# Patient Record
Sex: FEMALE | Race: WHITE | Employment: FULL TIME | URBAN - METROPOLITAN AREA
[De-identification: names, ages, dates, MRNs, and addresses within clinical notes are randomized per-mention and may not be internally consistent; named-entity substitution may affect disease eponyms.]

---

## 2017-04-03 LAB
CHOLESTEROL, TOTAL: 164 MG/DL (ref 0–199)
GLUCOSE BLD-MCNC: 94 MG/DL (ref 70–99)
HDLC SERPL-MCNC: 49 MG/DL (ref 40–60)
LDL CHOLESTEROL CALCULATED: 66 MG/DL
TRIGL SERPL-MCNC: 244 MG/DL (ref 0–150)

## 2018-05-19 ENCOUNTER — EMPLOYEE WELLNESS (OUTPATIENT)
Dept: OTHER | Age: 28
End: 2018-05-19

## 2018-05-19 LAB
CHOLESTEROL, TOTAL: 131 MG/DL (ref 0–199)
GLUCOSE BLD-MCNC: 79 MG/DL (ref 70–99)
HDLC SERPL-MCNC: 62 MG/DL (ref 40–60)
LDL CHOLESTEROL CALCULATED: 49 MG/DL
TRIGL SERPL-MCNC: 100 MG/DL (ref 0–150)

## 2018-05-29 VITALS — WEIGHT: 232 LBS

## 2019-02-19 ENCOUNTER — HOSPITAL ENCOUNTER (OUTPATIENT)
Age: 29
Discharge: HOME | End: 2019-02-19
Payer: COMMERCIAL

## 2019-02-19 LAB
ESTRADIOL LEVEL: 45 PG/ML
FOLLICLE STIMULATING HORMONE: 5.7 MIU/ML
GLUCOSE FASTING: 86 MG/DL (ref 70–99)
LUTEINIZING HORMONE: 8.7 MIU/ML
PROLACTIN: 9.3 NG/ML
TSH SERPL DL<=0.05 MIU/L-ACNC: 2.82 UIU/ML (ref 0.27–4.2)

## 2019-02-19 PROCEDURE — 36415 COLL VENOUS BLD VENIPUNCTURE: CPT

## 2019-02-19 PROCEDURE — 84702 CHORIONIC GONADOTROPIN TEST: CPT

## 2019-02-19 PROCEDURE — 82947 ASSAY GLUCOSE BLOOD QUANT: CPT

## 2019-02-19 PROCEDURE — 82670 ASSAY OF TOTAL ESTRADIOL: CPT

## 2019-02-19 PROCEDURE — 83001 ASSAY OF GONADOTROPIN (FSH): CPT

## 2019-02-19 PROCEDURE — 84403 ASSAY OF TOTAL TESTOSTERONE: CPT

## 2019-02-19 PROCEDURE — 84146 ASSAY OF PROLACTIN: CPT

## 2019-02-19 PROCEDURE — 84443 ASSAY THYROID STIM HORMONE: CPT

## 2019-02-19 PROCEDURE — 84270 ASSAY OF SEX HORMONE GLOBUL: CPT

## 2019-02-19 PROCEDURE — 83002 ASSAY OF GONADOTROPIN (LH): CPT

## 2019-02-20 LAB — GONADOTROPIN, CHORIONIC (HCG) QUANT: <5 MIU/ML

## 2019-02-21 LAB
SEX HORMONE BINDING GLOBULIN: 30 NMOL/L (ref 30–135)
TESTOSTERONE FREE-NONMALE: 12.8 PG/ML (ref 0.8–7.4)
TESTOSTERONE TOTAL: 67 NG/DL (ref 20–70)

## 2019-10-22 LAB
ABO, EXTERNAL RESULT: NORMAL
C. TRACHOMATIS, EXTERNAL RESULT: NEGATIVE
HEP B, EXTERNAL RESULT: NEGATIVE
HIV, EXTERNAL RESULT: NEGATIVE
N. GONORRHOEAE, EXTERNAL RESULT: NEGATIVE
RH FACTOR, EXTERNAL RESULT: POSITIVE
RPR, EXTERNAL RESULT: NON REACTIVE
RUBELLA TITER, EXTERNAL RESULT: NORMAL

## 2020-02-25 ENCOUNTER — HOSPITAL ENCOUNTER (OUTPATIENT)
Dept: DIABETES SERVICES | Age: 30
Setting detail: THERAPIES SERIES
Discharge: HOME | End: 2020-02-25
Payer: COMMERCIAL

## 2020-02-25 VITALS — WEIGHT: 241 LBS

## 2020-02-25 PROCEDURE — G0109 DIAB MANAGE TRN IND/GROUP: HCPCS | Performed by: DIETITIAN, REGISTERED

## 2020-02-25 ASSESSMENT — PATIENT HEALTH QUESTIONNAIRE - PHQ9
2. FEELING DOWN, DEPRESSED OR HOPELESS: 0
SUM OF ALL RESPONSES TO PHQ QUESTIONS 1-9: 1
1. LITTLE INTEREST OR PLEASURE IN DOING THINGS: 1
SUM OF ALL RESPONSES TO PHQ9 QUESTIONS 1 & 2: 1
SUM OF ALL RESPONSES TO PHQ QUESTIONS 1-9: 1

## 2020-02-25 NOTE — LETTER
Diabetes Education  Ciaran Adkins 76: [address] [facility]    RE: Sabas BALTAZAR.: 1990    Your patient was seen for Gestational Diabetes Education on 2020. EDUCATION INCLUDED:   [x]  Definition of Gestational Diabetes                                                                                 [x]  Risk Factors/Acute Complications     [x]  Blood Glucose Monitoring        [x]  Exercise/Activity    [x]  Carbohydrate Counting  [x]  Timing of Meals    [x]  Food Logs  [x]  Eating Out  [x]  Nutrition Precautions for Pregnancy  [x]  Use of Blood glucose meter, including return demonstration:       Random Blood glucose at visit: 84 mg/dl ( about 4 hours after breakfast)  [x]  PHQ2 Depression Screen; patient scored 1. Recommend further evaluation if       score >3    PATIENT GOALS:  [x]  Follow meal plan with appropriate Carbohydrate intake:   Breakfast: 15 - 30 g Carb    Lunch: 30 - 45 g Carb   Dinner:  45 - 60 g Carb    3 snacks: 15 - 30 g Carb each  [x] Monitor blood glucose four times daily. FBG Goal: 60 - 95 or as recommended by physician   1hr PP <140 or as recommended by physician   2 hr PP <120 or as recommended by physician  [x] Exercise    [x] Log glucose results and bring to physicians office at each scheduled appointment  [x] Other:     PLAN:  [x] Return for follow-up visit to review Food/Log      Thank you for the opportunity to provide Diabetes Self Management Education to your patient.     Meghan Templeton  Diabetes Educator

## 2020-02-25 NOTE — PROGRESS NOTES
Gestational Diabetes Patient Assessment and Education    Name: Lj Santiago : 1990  EDC:20        Education Completed  [x]  Definition of Gestational Diabetes                                                                                 [x]  Risk Factors/Acute Complications     [x]  Blood Glucose Monitoring--schedule, target levels     [x]  Exercise/Activity    [x]  Carbohydrate Counting  [x]  Timing of Meals    [x]  Food Logs  [x]  Eating Out  [x]  Nutrition Precautions for Pregnancy  []  Use of Blood glucose meter            Patient Goals  [x]  Follow meal plan with appropriate Carbohydrate intake:   Breakfast: 30 g Carb    Lunch: 30 - 45 g Carb   Dinner:  45 - 60 g Carb    3 snacks: 15 - 30 g Carb each  [x] Monitor blood glucose four times daily.     FBG Goal: 60 - 95 or as recommended by physician   1hr PP <140 or as recommended by physician   2 hr PP <120 or as recommended by physician  [x] Exercise    [x] Log glucose results and bring to physicians office at each scheduled appointment  [] Other:     Plan  [x] Return for follow-up visit to review Food/Log      Referring Provider: Ca Mathews    Total participants in Group:2

## 2020-02-28 LAB
ORGANISM: ABNORMAL
URINE CULTURE, ROUTINE: ABNORMAL

## 2020-03-04 ENCOUNTER — HOSPITAL ENCOUNTER (OUTPATIENT)
Dept: DIABETES SERVICES | Age: 30
Setting detail: THERAPIES SERIES
Discharge: HOME | End: 2020-03-04
Payer: COMMERCIAL

## 2020-03-04 PROCEDURE — G0108 DIAB MANAGE TRN  PER INDIV: HCPCS | Performed by: DIETITIAN, REGISTERED

## 2020-03-04 NOTE — LETTER
Diabetes Education  Ciaran Adkins 76: [address] [facility]    RE: Opal BALTAZAR.: 1990    Your patient attended a Follow-up session for Gestational Diabetes on 3/4/2020. Education included the following:   [x] Review of BG Log   Fasting BG Range: mg/dl     2 hr PP BG Range:  mg/dl                                                                   [x] Review of Food Log   [] Additional Resources  [x]  Diet guidelines for sick days  [x] Post-Partum Guidelines  [x] Other: Eats dinner late most evenings, recommended walking after dinner. Post Program Recommendations include:  [x] Diabetes Screening 6 - 12 weeks post partum  [] Pre-Diabetes Group Class   [] Other : Thank you for the opportunity to provide Diabetes Education to your patient.     [address] Diabetes Educator

## 2020-03-04 NOTE — PROGRESS NOTES
Gestational Diabetes Patient Assessment and Education    Name: Kaleigh Tong : 1990  EDC:20    BG/FOOD LOG REVIEW:  [x]  BG Log reviewed: FBG range:  mg/dl   PP BG range:  mg/dl  [x]  Other:Recommended exercise after dinner.      Education Completed  [x]  Problem Solving   [x]  Carbohydrate Counting  [x]  Timing of Meals    [x]  Post-Partum Guidelines  [x]  Diet guidelines for sick days  []  Additional Resources  []  Other:    Participant selected Post-Program Diabetes Self-Management Support Plan:   [] Nurse appointment  [] Dietitian appointment  [x] Follow-up with Referring Provider  [] Other:       Referring Provider: Paty Whatley  Total time spent with patient: 30 minutes

## 2020-03-10 ENCOUNTER — OFFICE VISIT (OUTPATIENT)
Dept: FAMILY MEDICINE CLINIC | Age: 30
End: 2020-03-10
Payer: COMMERCIAL

## 2020-03-10 VITALS
OXYGEN SATURATION: 98 % | HEART RATE: 115 BPM | DIASTOLIC BLOOD PRESSURE: 84 MMHG | SYSTOLIC BLOOD PRESSURE: 120 MMHG | BODY MASS INDEX: 38.89 KG/M2 | TEMPERATURE: 98.3 F | WEIGHT: 242 LBS | HEIGHT: 66 IN

## 2020-03-10 LAB
INFLUENZA A ANTIGEN, POC: NORMAL
INFLUENZA B ANTIGEN, POC: NORMAL

## 2020-03-10 PROCEDURE — 90715 TDAP VACCINE 7 YRS/> IM: CPT | Performed by: FAMILY MEDICINE

## 2020-03-10 PROCEDURE — 87804 INFLUENZA ASSAY W/OPTIC: CPT | Performed by: FAMILY MEDICINE

## 2020-03-10 PROCEDURE — 90471 IMMUNIZATION ADMIN: CPT | Performed by: FAMILY MEDICINE

## 2020-03-10 PROCEDURE — 99214 OFFICE O/P EST MOD 30 MIN: CPT | Performed by: FAMILY MEDICINE

## 2020-03-10 RX ORDER — AZITHROMYCIN 250 MG/1
TABLET, FILM COATED ORAL
Qty: 6 TABLET | Refills: 0 | Status: SHIPPED | OUTPATIENT
Start: 2020-03-10 | End: 2020-10-30 | Stop reason: ALTCHOICE

## 2020-03-10 ASSESSMENT — ENCOUNTER SYMPTOMS
DIARRHEA: 0
COUGH: 1
VOICE CHANGE: 0
SHORTNESS OF BREATH: 0
WHEEZING: 0
EYE REDNESS: 0
NAUSEA: 0
CONSTIPATION: 0
RHINORRHEA: 1
VOMITING: 0

## 2020-03-10 NOTE — PROGRESS NOTES
Rhythm: Regular rhythm. Tachycardia present. Pulmonary:      Effort: Pulmonary effort is normal. No accessory muscle usage or respiratory distress. Breath sounds: Normal breath sounds. No wheezing, rhonchi or rales. Lymphadenopathy:      Cervical: No cervical adenopathy. Skin:     General: Skin is warm and dry. Findings: No erythema or rash. Neurological:      Mental Status: She is alert and oriented to person, place, and time. Deep Tendon Reflexes: Reflexes are normal and symmetric. Psychiatric:         Mood and Affect: Mood normal.         Behavior: Behavior normal.         Thought Content: Thought content normal.         Judgment: Judgment normal.          Diagnosis Orders   1. Acute bronchitis, unspecified organism     2. Flu-like symptoms  POCT Influenza A/B Antigen   3. Incidental pregnancy     4. Sinus tachycardia       Adalid Fleming was seen today for established new doctor, cough and chest congestion. Diagnoses and all orders for this visit:    Acute bronchitis, unspecified organism  -     azithromycin (ZITHROMAX) 250 MG tablet; 500mg on day 1 followed by 250mg on days 2 - 5  Flu-like symptoms  -     POCT Influenza A/B Antigen    Incidental pregnancy  -     Tdap (age 6y and older) IM (BOOSTRIX)  Sinus tachycardia: probably due to bmi 39, pregnant and current bronchtis, no distress                    An electronic signature was used to authenticate this note. This was dictated. Errors mightbe possible due to dictation.   --Melly Toth, DO

## 2020-03-13 ENCOUNTER — TELEPHONE (OUTPATIENT)
Dept: FAMILY MEDICINE CLINIC | Age: 30
End: 2020-03-13

## 2020-03-13 RX ORDER — AZITHROMYCIN 250 MG/1
250 TABLET, FILM COATED ORAL SEE ADMIN INSTRUCTIONS
Qty: 6 TABLET | Refills: 0 | Status: SHIPPED | OUTPATIENT
Start: 2020-03-13 | End: 2020-03-18

## 2020-03-13 NOTE — TELEPHONE ENCOUNTER
Mehreen Smart is 5 days and it stays in your system for 5 more days, so I do not recommend she take another antibiotic at this point. Also, antibiotics do not treat any viral component of her illness, her body will get over that part on its own. OK to use over the counter flonase or saline nasal spray for congestion. She can rest and drink lots of fluids to help thin out her mucous. If worsens, she can let us know.

## 2020-03-13 NOTE — TELEPHONE ENCOUNTER
Pt called stating that she is almost on the last dose of Zithromax and was wondering if she could get a 2nd dose of the medication. She is starting to feel better but still has a lot of congestion. Medication-  azthromycin (Zithromax) 250 mg tablet    Pharmacy:  [address], [address] [place] [address] [phone] Norbert Jackie [phone]    Last appt  3/10/2020    No future appointments.

## 2020-04-14 LAB — GBS, EXTERNAL RESULT: NEGATIVE

## 2020-05-05 ENCOUNTER — ANESTHESIA (OUTPATIENT)
Dept: LABOR AND DELIVERY | Age: 30
End: 2020-05-05
Payer: COMMERCIAL

## 2020-05-05 ENCOUNTER — ANESTHESIA EVENT (OUTPATIENT)
Dept: LABOR AND DELIVERY | Age: 30
End: 2020-05-05
Payer: COMMERCIAL

## 2020-05-05 ENCOUNTER — HOSPITAL ENCOUNTER (INPATIENT)
Age: 30
LOS: 3 days | Discharge: HOME | End: 2020-05-08
Attending: OBSTETRICS & GYNECOLOGY | Admitting: OBSTETRICS & GYNECOLOGY
Payer: COMMERCIAL

## 2020-05-05 ENCOUNTER — APPOINTMENT (OUTPATIENT)
Dept: LABOR AND DELIVERY | Age: 30
End: 2020-05-05
Payer: COMMERCIAL

## 2020-05-05 PROBLEM — Z3A.39 39 WEEKS GESTATION OF PREGNANCY: Status: ACTIVE | Noted: 2020-05-05

## 2020-05-05 LAB
ABO/RH: NORMAL
AMPHETAMINE SCREEN, URINE: NORMAL
ANTIBODY SCREEN: NORMAL
BARBITURATE SCREEN URINE: NORMAL
BASOPHILS ABSOLUTE: 0.1 K/UL (ref 0–0.2)
BASOPHILS RELATIVE PERCENT: 0.9 %
BENZODIAZEPINE SCREEN, URINE: NORMAL
BUPRENORPHINE URINE: NORMAL
CANNABINOID SCREEN URINE: NORMAL
COCAINE METABOLITE SCREEN URINE: NORMAL
EOSINOPHILS ABSOLUTE: 0.2 K/UL (ref 0–0.6)
EOSINOPHILS RELATIVE PERCENT: 1.4 %
HCT VFR BLD CALC: 35.1 % (ref 36–48)
HEMOGLOBIN: 11.7 G/DL (ref 12–16)
LYMPHOCYTES ABSOLUTE: 2.7 K/UL (ref 1–5.1)
LYMPHOCYTES RELATIVE PERCENT: 22.3 %
Lab: NORMAL
MCH RBC QN AUTO: 26.6 PG (ref 26–34)
MCHC RBC AUTO-ENTMCNC: 33.4 G/DL (ref 31–36)
MCV RBC AUTO: 79.7 FL (ref 80–100)
METHADONE SCREEN, URINE: NORMAL
MONOCYTES ABSOLUTE: 0.7 K/UL (ref 0–1.3)
MONOCYTES RELATIVE PERCENT: 5.5 %
NEUTROPHILS ABSOLUTE: 8.5 K/UL (ref 1.7–7.7)
NEUTROPHILS RELATIVE PERCENT: 69.9 %
OPIATE SCREEN URINE: NORMAL
OXYCODONE URINE: NORMAL
PDW BLD-RTO: 14.9 % (ref 12.4–15.4)
PH UA: 6
PHENCYCLIDINE SCREEN URINE: NORMAL
PLATELET # BLD: 345 K/UL (ref 135–450)
PMV BLD AUTO: 8.5 FL (ref 5–10.5)
PROPOXYPHENE SCREEN: NORMAL
RBC # BLD: 4.4 M/UL (ref 4–5.2)
TOTAL SYPHILLIS IGG/IGM: NORMAL
WBC # BLD: 12.1 K/UL (ref 4–11)

## 2020-05-05 PROCEDURE — 2580000003 HC RX 258: Performed by: OBSTETRICS & GYNECOLOGY

## 2020-05-05 PROCEDURE — 86901 BLOOD TYPING SEROLOGIC RH(D): CPT

## 2020-05-05 PROCEDURE — 3700000025 EPIDURAL BLOCK: Performed by: ANESTHESIOLOGY

## 2020-05-05 PROCEDURE — 2500000003 HC RX 250 WO HCPCS: Performed by: NURSE ANESTHETIST, CERTIFIED REGISTERED

## 2020-05-05 PROCEDURE — 86900 BLOOD TYPING SEROLOGIC ABO: CPT

## 2020-05-05 PROCEDURE — 51701 INSERT BLADDER CATHETER: CPT

## 2020-05-05 PROCEDURE — 80307 DRUG TEST PRSMV CHEM ANLYZR: CPT

## 2020-05-05 PROCEDURE — 86780 TREPONEMA PALLIDUM: CPT

## 2020-05-05 PROCEDURE — 86850 RBC ANTIBODY SCREEN: CPT

## 2020-05-05 PROCEDURE — 1220000000 HC SEMI PRIVATE OB R&B

## 2020-05-05 PROCEDURE — 6360000002 HC RX W HCPCS: Performed by: OBSTETRICS & GYNECOLOGY

## 2020-05-05 PROCEDURE — 85025 COMPLETE CBC W/AUTO DIFF WBC: CPT

## 2020-05-05 RX ORDER — ACETAMINOPHEN 325 MG/1
650 TABLET ORAL EVERY 4 HOURS PRN
Status: DISCONTINUED | OUTPATIENT
Start: 2020-05-05 | End: 2020-05-06

## 2020-05-05 RX ORDER — SODIUM CHLORIDE, SODIUM LACTATE, POTASSIUM CHLORIDE, CALCIUM CHLORIDE 600; 310; 30; 20 MG/100ML; MG/100ML; MG/100ML; MG/100ML
INJECTION, SOLUTION INTRAVENOUS CONTINUOUS
Status: DISCONTINUED | OUTPATIENT
Start: 2020-05-05 | End: 2020-05-06

## 2020-05-05 RX ORDER — SODIUM CHLORIDE 0.9 % (FLUSH) 0.9 %
10 SYRINGE (ML) INJECTION PRN
Status: DISCONTINUED | OUTPATIENT
Start: 2020-05-05 | End: 2020-05-06

## 2020-05-05 RX ORDER — LIDOCAINE HYDROCHLORIDE 10 MG/ML
30 INJECTION, SOLUTION EPIDURAL; INFILTRATION; INTRACAUDAL; PERINEURAL PRN
Status: DISCONTINUED | OUTPATIENT
Start: 2020-05-05 | End: 2020-05-06

## 2020-05-05 RX ORDER — SODIUM CHLORIDE 0.9 % (FLUSH) 0.9 %
10 SYRINGE (ML) INJECTION EVERY 12 HOURS SCHEDULED
Status: DISCONTINUED | OUTPATIENT
Start: 2020-05-05 | End: 2020-05-06

## 2020-05-05 RX ORDER — DIPHENHYDRAMINE HYDROCHLORIDE 50 MG/ML
25 INJECTION INTRAMUSCULAR; INTRAVENOUS EVERY 4 HOURS PRN
Status: DISCONTINUED | OUTPATIENT
Start: 2020-05-05 | End: 2020-05-06

## 2020-05-05 RX ORDER — BUPIVACAINE HYDROCHLORIDE 2.5 MG/ML
INJECTION, SOLUTION EPIDURAL; INFILTRATION; INTRACAUDAL PRN
Status: DISCONTINUED | OUTPATIENT
Start: 2020-05-05 | End: 2020-05-06 | Stop reason: SDUPTHER

## 2020-05-05 RX ORDER — ONDANSETRON 2 MG/ML
4 INJECTION INTRAMUSCULAR; INTRAVENOUS EVERY 6 HOURS PRN
Status: DISCONTINUED | OUTPATIENT
Start: 2020-05-05 | End: 2020-05-06

## 2020-05-05 RX ADMIN — BUPIVACAINE HYDROCHLORIDE 5 ML: 2.5 INJECTION, SOLUTION EPIDURAL; INFILTRATION; INTRACAUDAL; PERINEURAL at 14:30

## 2020-05-05 RX ADMIN — SODIUM CHLORIDE, POTASSIUM CHLORIDE, SODIUM LACTATE AND CALCIUM CHLORIDE: 600; 310; 30; 20 INJECTION, SOLUTION INTRAVENOUS at 14:14

## 2020-05-05 RX ADMIN — ONDANSETRON 4 MG: 2 INJECTION INTRAMUSCULAR; INTRAVENOUS at 20:10

## 2020-05-05 RX ADMIN — SODIUM CHLORIDE, POTASSIUM CHLORIDE, SODIUM LACTATE AND CALCIUM CHLORIDE: 600; 310; 30; 20 INJECTION, SOLUTION INTRAVENOUS at 19:23

## 2020-05-05 RX ADMIN — Medication 15 ML/HR: at 14:34

## 2020-05-05 RX ADMIN — Medication 1 MILLI-UNITS/MIN: at 09:25

## 2020-05-05 RX ADMIN — SODIUM CHLORIDE, POTASSIUM CHLORIDE, SODIUM LACTATE AND CALCIUM CHLORIDE: 600; 310; 30; 20 INJECTION, SOLUTION INTRAVENOUS at 08:45

## 2020-05-05 ASSESSMENT — PAIN - FUNCTIONAL ASSESSMENT: PAIN_FUNCTIONAL_ASSESSMENT: 0-10

## 2020-05-05 NOTE — PROGRESS NOTES
28yo G1P) at 39.4wks presented to [facility] for induction of labor. Ambulated to and oriented to room 384. Monitors applied. Hx obtained. Consents signed.

## 2020-05-05 NOTE — H&P
service: Not on file     Active member of club or organization: Not on file     Attends meetings of clubs or organizations: Not on file     Relationship status: Not on file    Intimate partner violence     Fear of current or ex partner: Not on file     Emotionally abused: Not on file     Physically abused: Not on file     Forced sexual activity: Not on file   Other Topics Concern    Not on file   Social History Narrative    Not on file     Family History:       Problem Relation Age of Onset    High Blood Pressure Mother     High Cholesterol Mother      Medications Prior to Admission:  Medications Prior to Admission: Prenat-Fe Carbonyl-FA-Omega 3 (ONE-A-DAY WOMENS PRENATAL 1 PO),   azithromycin (ZITHROMAX) 250 MG tablet, 500mg on day 1 followed by 250mg on days 2 - 5    REVIEW OF SYSTEMS:    wnl    PHYSICAL EXAM:  Vitals:    05/05/20 0836 05/05/20 0852 05/05/20 0933   BP: (!) 160/95 (!) 159/101    Pulse: 112 109    Resp: 16     Temp: 98.6 °F (37 °C)     TempSrc: Oral     Weight:   250 lb (113.4 kg)   Height:   5' 5\" (1.651 m)     General appearance:  awake, alert, cooperative, no apparent distress, and appears stated age  Neurologic:  Awake, alert, oriented to name, place and time. Lungs:  No increased work of breathing, good air exchange  Abdomen:  Soft, non tender, gravid, consistent with her gestational age, EFW by Leopold's maneuver was 3800g   Fetal heart rate:  Reassuring.   Pelvis:  Adequate pelvis  Cervix: 3/80/-2  Contraction frequency:  none    Membranes:  Intact    Labs:   CBC:   Lab Results   Component Value Date    WBC 12.1 05/05/2020    RBC 4.40 05/05/2020    HGB 11.7 05/05/2020    HCT 35.1 05/05/2020    MCV 79.7 05/05/2020    MCH 26.6 05/05/2020    MCHC 33.4 05/05/2020    RDW 14.9 05/05/2020     05/05/2020    MPV 8.5 05/05/2020       ASSESSMENT AND PLAN:    Labor: Admit, anticipate normal delivery, routine labor orders  Fetus: Reassuring  GBS: No  Other: pitocin

## 2020-05-05 NOTE — PROGRESS NOTES
Dr Joana Do made aware of increase in [address] [facility] with temp of 99.2 Ax (97.8 oral) and increasing in B/P. No new orders noted, will continue to monitor.

## 2020-05-06 VITALS — OXYGEN SATURATION: 97 % | SYSTOLIC BLOOD PRESSURE: 94 MMHG | DIASTOLIC BLOOD PRESSURE: 49 MMHG

## 2020-05-06 PROCEDURE — 3700000000 HC ANESTHESIA ATTENDED CARE: Performed by: OBSTETRICS & GYNECOLOGY

## 2020-05-06 PROCEDURE — 2580000003 HC RX 258: Performed by: NURSE ANESTHETIST, CERTIFIED REGISTERED

## 2020-05-06 PROCEDURE — 7100000001 HC PACU RECOVERY - ADDTL 15 MIN: Performed by: OBSTETRICS & GYNECOLOGY

## 2020-05-06 PROCEDURE — 6360000002 HC RX W HCPCS: Performed by: OBSTETRICS & GYNECOLOGY

## 2020-05-06 PROCEDURE — 6370000000 HC RX 637 (ALT 250 FOR IP): Performed by: OBSTETRICS & GYNECOLOGY

## 2020-05-06 PROCEDURE — 3609079900 HC CESAREAN SECTION: Performed by: OBSTETRICS & GYNECOLOGY

## 2020-05-06 PROCEDURE — 2709999900 HC NON-CHARGEABLE SUPPLY: Performed by: OBSTETRICS & GYNECOLOGY

## 2020-05-06 PROCEDURE — 3700000001 HC ADD 15 MINUTES (ANESTHESIA): Performed by: OBSTETRICS & GYNECOLOGY

## 2020-05-06 PROCEDURE — 6360000002 HC RX W HCPCS: Performed by: NURSE ANESTHETIST, CERTIFIED REGISTERED

## 2020-05-06 PROCEDURE — 2580000003 HC RX 258: Performed by: OBSTETRICS & GYNECOLOGY

## 2020-05-06 PROCEDURE — 2500000003 HC RX 250 WO HCPCS: Performed by: NURSE ANESTHETIST, CERTIFIED REGISTERED

## 2020-05-06 PROCEDURE — 1220000000 HC SEMI PRIVATE OB R&B

## 2020-05-06 PROCEDURE — 7100000000 HC PACU RECOVERY - FIRST 15 MIN: Performed by: OBSTETRICS & GYNECOLOGY

## 2020-05-06 RX ORDER — OXYTOCIN 10 [USP'U]/ML
INJECTION, SOLUTION INTRAMUSCULAR; INTRAVENOUS PRN
Status: DISCONTINUED | OUTPATIENT
Start: 2020-05-06 | End: 2020-05-06 | Stop reason: SDUPTHER

## 2020-05-06 RX ORDER — DEXAMETHASONE SODIUM PHOSPHATE 4 MG/ML
INJECTION, SOLUTION INTRA-ARTICULAR; INTRALESIONAL; INTRAMUSCULAR; INTRAVENOUS; SOFT TISSUE PRN
Status: DISCONTINUED | OUTPATIENT
Start: 2020-05-06 | End: 2020-05-06 | Stop reason: SDUPTHER

## 2020-05-06 RX ORDER — ONDANSETRON 2 MG/ML
4 INJECTION INTRAMUSCULAR; INTRAVENOUS EVERY 6 HOURS PRN
Status: DISCONTINUED | OUTPATIENT
Start: 2020-05-06 | End: 2020-05-08 | Stop reason: HOSPADM

## 2020-05-06 RX ORDER — PROPOFOL 10 MG/ML
INJECTION, EMULSION INTRAVENOUS PRN
Status: DISCONTINUED | OUTPATIENT
Start: 2020-05-06 | End: 2020-05-06 | Stop reason: SDUPTHER

## 2020-05-06 RX ORDER — ONDANSETRON 2 MG/ML
INJECTION INTRAMUSCULAR; INTRAVENOUS PRN
Status: DISCONTINUED | OUTPATIENT
Start: 2020-05-06 | End: 2020-05-06 | Stop reason: SDUPTHER

## 2020-05-06 RX ORDER — LANOLIN 100 %
OINTMENT (GRAM) TOPICAL
Status: DISCONTINUED | OUTPATIENT
Start: 2020-05-06 | End: 2020-05-08 | Stop reason: HOSPADM

## 2020-05-06 RX ORDER — KETOROLAC TROMETHAMINE 30 MG/ML
INJECTION, SOLUTION INTRAMUSCULAR; INTRAVENOUS PRN
Status: DISCONTINUED | OUTPATIENT
Start: 2020-05-06 | End: 2020-05-06 | Stop reason: SDUPTHER

## 2020-05-06 RX ORDER — OXYCODONE HYDROCHLORIDE 5 MG/1
10 TABLET ORAL EVERY 4 HOURS PRN
Status: DISCONTINUED | OUTPATIENT
Start: 2020-05-06 | End: 2020-05-08 | Stop reason: HOSPADM

## 2020-05-06 RX ORDER — SODIUM CHLORIDE 0.9 % (FLUSH) 0.9 %
10 SYRINGE (ML) INJECTION EVERY 12 HOURS SCHEDULED
Status: DISCONTINUED | OUTPATIENT
Start: 2020-05-06 | End: 2020-05-08 | Stop reason: HOSPADM

## 2020-05-06 RX ORDER — DOCUSATE SODIUM 100 MG/1
100 CAPSULE, LIQUID FILLED ORAL 2 TIMES DAILY PRN
Status: DISCONTINUED | OUTPATIENT
Start: 2020-05-06 | End: 2020-05-08 | Stop reason: HOSPADM

## 2020-05-06 RX ORDER — OXYCODONE HYDROCHLORIDE 5 MG/1
5 TABLET ORAL EVERY 4 HOURS PRN
Status: DISCONTINUED | OUTPATIENT
Start: 2020-05-06 | End: 2020-05-08 | Stop reason: HOSPADM

## 2020-05-06 RX ORDER — ACETAMINOPHEN 500 MG
1000 TABLET ORAL
Status: COMPLETED | OUTPATIENT
Start: 2020-05-06 | End: 2020-05-06

## 2020-05-06 RX ORDER — KETOROLAC TROMETHAMINE 30 MG/ML
30 INJECTION, SOLUTION INTRAMUSCULAR; INTRAVENOUS EVERY 8 HOURS
Status: DISCONTINUED | OUTPATIENT
Start: 2020-05-06 | End: 2020-05-08 | Stop reason: HOSPADM

## 2020-05-06 RX ORDER — SODIUM CHLORIDE, SODIUM LACTATE, POTASSIUM CHLORIDE, CALCIUM CHLORIDE 600; 310; 30; 20 MG/100ML; MG/100ML; MG/100ML; MG/100ML
INJECTION, SOLUTION INTRAVENOUS CONTINUOUS
Status: DISCONTINUED | OUTPATIENT
Start: 2020-05-06 | End: 2020-05-08 | Stop reason: HOSPADM

## 2020-05-06 RX ORDER — SODIUM CHLORIDE 0.9 % (FLUSH) 0.9 %
10 SYRINGE (ML) INJECTION PRN
Status: DISCONTINUED | OUTPATIENT
Start: 2020-05-06 | End: 2020-05-08 | Stop reason: HOSPADM

## 2020-05-06 RX ORDER — LIDOCAINE HYDROCHLORIDE 20 MG/ML
INJECTION, SOLUTION INFILTRATION; PERINEURAL PRN
Status: DISCONTINUED | OUTPATIENT
Start: 2020-05-06 | End: 2020-05-06 | Stop reason: SDUPTHER

## 2020-05-06 RX ORDER — FERROUS SULFATE 325(65) MG
325 TABLET ORAL 2 TIMES DAILY WITH MEALS
Status: DISCONTINUED | OUTPATIENT
Start: 2020-05-06 | End: 2020-05-08 | Stop reason: HOSPADM

## 2020-05-06 RX ORDER — MORPHINE SULFATE 0.5 MG/ML
INJECTION, SOLUTION EPIDURAL; INTRATHECAL; INTRAVENOUS PRN
Status: DISCONTINUED | OUTPATIENT
Start: 2020-05-06 | End: 2020-05-06 | Stop reason: SDUPTHER

## 2020-05-06 RX ORDER — IBUPROFEN 800 MG/1
800 TABLET ORAL EVERY 8 HOURS SCHEDULED
Status: DISCONTINUED | OUTPATIENT
Start: 2020-05-06 | End: 2020-05-08 | Stop reason: HOSPADM

## 2020-05-06 RX ORDER — ACETAMINOPHEN 500 MG
1000 TABLET ORAL EVERY 8 HOURS SCHEDULED
Status: DISCONTINUED | OUTPATIENT
Start: 2020-05-06 | End: 2020-05-08 | Stop reason: HOSPADM

## 2020-05-06 RX ORDER — SODIUM CHLORIDE, SODIUM LACTATE, POTASSIUM CHLORIDE, CALCIUM CHLORIDE 600; 310; 30; 20 MG/100ML; MG/100ML; MG/100ML; MG/100ML
INJECTION, SOLUTION INTRAVENOUS CONTINUOUS PRN
Status: DISCONTINUED | OUTPATIENT
Start: 2020-05-06 | End: 2020-05-06 | Stop reason: SDUPTHER

## 2020-05-06 RX ORDER — PHENYLEPHRINE HYDROCHLORIDE 10 MG/ML
INJECTION INTRAVENOUS PRN
Status: DISCONTINUED | OUTPATIENT
Start: 2020-05-06 | End: 2020-05-06 | Stop reason: SDUPTHER

## 2020-05-06 RX ORDER — PRENATAL WITH FERROUS FUM AND FOLIC ACID 3080; 920; 120; 400; 22; 1.84; 3; 20; 10; 1; 12; 200; 27; 25; 2 [IU]/1; [IU]/1; MG/1; [IU]/1; MG/1; MG/1; MG/1; MG/1; MG/1; MG/1; UG/1; MG/1; MG/1; MG/1; MG/1
1 TABLET ORAL DAILY
Status: DISCONTINUED | OUTPATIENT
Start: 2020-05-06 | End: 2020-05-08 | Stop reason: HOSPADM

## 2020-05-06 RX ORDER — AZITHROMYCIN 500 MG/1
INJECTION, POWDER, LYOPHILIZED, FOR SOLUTION INTRAVENOUS
Status: DISCONTINUED
Start: 2020-05-06 | End: 2020-05-06

## 2020-05-06 RX ORDER — SIMETHICONE 80 MG
80 TABLET,CHEWABLE ORAL EVERY 6 HOURS PRN
Status: DISCONTINUED | OUTPATIENT
Start: 2020-05-06 | End: 2020-05-08 | Stop reason: HOSPADM

## 2020-05-06 RX ADMIN — OXYTOCIN 20 UNITS: 10 INJECTION INTRAVENOUS at 03:14

## 2020-05-06 RX ADMIN — PROPOFOL 10 MG: 10 INJECTION, EMULSION INTRAVENOUS at 03:40

## 2020-05-06 RX ADMIN — SODIUM CHLORIDE, POTASSIUM CHLORIDE, SODIUM LACTATE AND CALCIUM CHLORIDE: 600; 310; 30; 20 INJECTION, SOLUTION INTRAVENOUS at 05:08

## 2020-05-06 RX ADMIN — KETOROLAC TROMETHAMINE 30 MG: 30 INJECTION, SOLUTION INTRAMUSCULAR at 06:40

## 2020-05-06 RX ADMIN — ENOXAPARIN SODIUM 40 MG: 40 INJECTION SUBCUTANEOUS at 14:39

## 2020-05-06 RX ADMIN — LIDOCAINE HYDROCHLORIDE 10 ML: 20 INJECTION, SOLUTION INFILTRATION; PERINEURAL at 02:49

## 2020-05-06 RX ADMIN — ONDANSETRON 4 MG: 2 INJECTION INTRAMUSCULAR; INTRAVENOUS at 06:40

## 2020-05-06 RX ADMIN — ONDANSETRON 4 MG: 2 INJECTION INTRAMUSCULAR; INTRAVENOUS at 03:22

## 2020-05-06 RX ADMIN — ACETAMINOPHEN 1000 MG: 500 TABLET ORAL at 20:14

## 2020-05-06 RX ADMIN — LIDOCAINE HYDROCHLORIDE 5 ML: 20 INJECTION, SOLUTION INFILTRATION; PERINEURAL at 02:55

## 2020-05-06 RX ADMIN — MORPHINE SULFATE 2.5 MG: 0.5 INJECTION, SOLUTION EPIDURAL; INTRATHECAL; INTRAVENOUS at 03:18

## 2020-05-06 RX ADMIN — CEFAZOLIN SODIUM 2 G: 10 INJECTION, POWDER, FOR SOLUTION INTRAVENOUS at 02:37

## 2020-05-06 RX ADMIN — OXYTOCIN 10 UNITS: 10 INJECTION INTRAVENOUS at 03:35

## 2020-05-06 RX ADMIN — CEFAZOLIN SODIUM 2 G: 10 INJECTION, POWDER, FOR SOLUTION INTRAVENOUS at 02:58

## 2020-05-06 RX ADMIN — PHENYLEPHRINE HYDROCHLORIDE 100 MCG: 10 INJECTION INTRAVENOUS at 02:58

## 2020-05-06 RX ADMIN — KETOROLAC TROMETHAMINE 30 MG: 30 INJECTION, SOLUTION INTRAMUSCULAR at 22:45

## 2020-05-06 RX ADMIN — KETOROLAC TROMETHAMINE 30 MG: 60 INJECTION, SOLUTION INTRAMUSCULAR at 03:52

## 2020-05-06 RX ADMIN — ACETAMINOPHEN 1000 MG: 500 TABLET ORAL at 02:43

## 2020-05-06 RX ADMIN — DEXAMETHASONE SODIUM PHOSPHATE 4 MG: 4 INJECTION, SOLUTION INTRAMUSCULAR; INTRAVENOUS at 03:22

## 2020-05-06 RX ADMIN — SODIUM CHLORIDE, POTASSIUM CHLORIDE, SODIUM LACTATE AND CALCIUM CHLORIDE: 600; 310; 30; 20 INJECTION, SOLUTION INTRAVENOUS at 03:35

## 2020-05-06 RX ADMIN — AZITHROMYCIN MONOHYDRATE 500 MG: 500 INJECTION, POWDER, LYOPHILIZED, FOR SOLUTION INTRAVENOUS at 03:06

## 2020-05-06 RX ADMIN — SODIUM CHLORIDE, POTASSIUM CHLORIDE, SODIUM LACTATE AND CALCIUM CHLORIDE: 600; 310; 30; 20 INJECTION, SOLUTION INTRAVENOUS at 02:55

## 2020-05-06 RX ADMIN — PROPOFOL 10 MG: 10 INJECTION, EMULSION INTRAVENOUS at 03:10

## 2020-05-06 RX ADMIN — KETOROLAC TROMETHAMINE 30 MG: 30 INJECTION, SOLUTION INTRAMUSCULAR at 14:39

## 2020-05-06 RX ADMIN — AZITHROMYCIN MONOHYDRATE 500 MG: 500 INJECTION, POWDER, LYOPHILIZED, FOR SOLUTION INTRAVENOUS at 02:50

## 2020-05-06 RX ADMIN — PROPOFOL 10 MG: 10 INJECTION, EMULSION INTRAVENOUS at 03:21

## 2020-05-06 ASSESSMENT — PULMONARY FUNCTION TESTS
PIF_VALUE: 0

## 2020-05-06 ASSESSMENT — PAIN SCALES - GENERAL
PAINLEVEL_OUTOF10: 4
PAINLEVEL_OUTOF10: 3
PAINLEVEL_OUTOF10: 4
PAINLEVEL_OUTOF10: 0
PAINLEVEL_OUTOF10: 4

## 2020-05-06 NOTE — PROGRESS NOTES
Department of Obstetrics and Gynecology  Labor and Delivery   Attending Progress Note      SUBJECTIVE:  Pt comfortable with epidural, feeling more pressure with pushing    OBJECTIVE:      Fetal heart rate:       Baseline Heart Rate:  160        Accelerations:  present       Long Term Variability:  moderate       Decelerations:  absent         Contraction frequency: 3 minutes    Membranes:  Ruptured clear fluid    Cervix:         Dilation:  Complete         Effacement:  100%         Station:  0         Position:  anterior             ASSESSMENT & PLAN:    A: Arrest of descent: discussed with pt and partner that she has been pushing for 2.5 hours with minimal descent and recommendation made for c/s. Reviewed r/b/a of c/s and will proceed    P: to OR for primary LTCS.

## 2020-05-06 NOTE — PLAN OF CARE
Problem: Anxiety:  Goal: Level of anxiety will decrease  Description: Level of anxiety will decrease  5/5/2020 2049 by Dusty Levine RN  Outcome: Ongoing  5/5/2020 0956 by Edmund Ríos RN  Outcome: Ongoing     Problem: Breathing Pattern - Ineffective:  Goal: Able to breathe comfortably  Description: Able to breathe comfortably  5/5/2020 2049 by Dusty Levine RN  Outcome: Ongoing  5/5/2020 0956 by Edmund Ríos RN  Outcome: Ongoing     Problem: Fluid Volume - Imbalance:  Goal: Absence of imbalanced fluid volume signs and symptoms  Description: Absence of imbalanced fluid volume signs and symptoms  5/5/2020 2049 by Dusty Levine RN  Outcome: Ongoing  5/5/2020 0956 by Edmund Ríos RN  Outcome: Ongoing  Goal: Absence of intrapartum hemorrhage signs and symptoms  Description: Absence of intrapartum hemorrhage signs and symptoms  5/5/2020 2049 by Dusty Levine RN  Outcome: Ongoing  5/5/2020 0956 by Edmund Ríos RN  Outcome: Ongoing     Problem: Infection - Intrapartum Infection:  Goal: Will show no infection signs and symptoms  Description: Will show no infection signs and symptoms  5/5/2020 2049 by Dusty Levine RN  Outcome: Ongoing  5/5/2020 0956 by Edmund Ríos RN  Outcome: Ongoing     Problem: Labor Process - Prolonged:  Goal: Labor progression, first stage, within specified pattern  Description: Labor progression, first stage, within specified pattern  5/5/2020 2049 by Dusty Levine RN  Outcome: Ongoing  5/5/2020 0956 by Edmund Ríos RN  Outcome: Ongoing  Goal: Labor progession, second stage, within specified pattern  Description: Labor progession, second stage, within specified pattern  5/5/2020 2049 by Dusty Levine RN  Outcome: Ongoing  5/5/2020 0956 by Edmund Ríos RN  Outcome: Ongoing  Goal: Uterine contractions within specified parameters  Description: Uterine contractions within specified parameters  5/5/2020 2049 by Dusty Levine RN  Outcome: Ongoing  5/5/2020 0956 by Edmund Ríos

## 2020-05-06 NOTE — PROGRESS NOTES
Called and updated Dr. Barclay Board of pushing and asked to come and evaluate pt. Dr. Barclay Board to come to bedside.   Ascencion Ace

## 2020-05-06 NOTE — PROGRESS NOTES
Subjective:     Postpartum Day 0:  Delivery-FTD after 3 hr 2nd stage    The patient feels tired. The patient denies emotional concerns. Pain is well controlled with current medications. The baby is in SCN-r/o infection. Baby is feeding via breast. Urinary output is adequate. The patient is not ambulating well. The patient is not tolerating a normal diet. Flatus denies been passed.     Objective:        Vitals:    20 0801   BP:    Pulse:    Resp:    Temp:    SpO2: 98%       Lab Results   Component Value Date    WBC 12.1 (H) 2020    HGB 11.7 (L) 2020    HCT 35.1 (L) 2020    MCV 79.7 (L) 2020     2020     Admission on 2020   Component Date Value Ref Range Status    WBC 2020 12.1* 4.0 - 11.0 K/uL Final    RBC 2020 4.40  4.00 - 5.20 M/uL Final    Hemoglobin 2020 11.7* 12.0 - 16.0 g/dL Final    Hematocrit 2020 35.1* 36.0 - 48.0 % Final    MCV 2020 79.7* 80.0 - 100.0 fL Final    MCH 2020 26.6  26.0 - 34.0 pg Final    MCHC 2020 33.4  31.0 - 36.0 g/dL Final    RDW 2020 14.9  12.4 - 15.4 % Final    Platelets  345  135 - 450 K/uL Final    MPV 2020 8.5  5.0 - 10.5 fL Final    Neutrophils % 2020 69.9  % Final    Lymphocytes % 2020 22.3  % Final    Monocytes % 2020 5.5  % Final    Eosinophils % 2020 1.4  % Final    Basophils % 2020 0.9  % Final    Neutrophils Absolute 2020 8.5* 1.7 - 7.7 K/uL Final    Lymphocytes Absolute 2020 2.7  1.0 - 5.1 K/uL Final    Monocytes Absolute 2020 0.7  0.0 - 1.3 K/uL Final    Eosinophils Absolute 2020 0.2  0.0 - 0.6 K/uL Final    Basophils Absolute 2020 0.1  0.0 - 0.2 K/uL Final    ABO/Rh 2020 A POS   Final    Antibody Screen 2020 NEG   Final    Total Syphillis IgG/IgM 2020 Non-Reactive  Non-reactive Final    Amphetamine Screen, Urine 2020 Neg  Negative <1000ng/mL Final   

## 2020-05-07 LAB
HCT VFR BLD CALC: 23.8 % (ref 36–48)
HEMOGLOBIN: 7.9 G/DL (ref 12–16)
MCH RBC QN AUTO: 26.6 PG (ref 26–34)
MCHC RBC AUTO-ENTMCNC: 33.3 G/DL (ref 31–36)
MCV RBC AUTO: 79.9 FL (ref 80–100)
PDW BLD-RTO: 15.2 % (ref 12.4–15.4)
PLATELET # BLD: 260 K/UL (ref 135–450)
PMV BLD AUTO: 8.2 FL (ref 5–10.5)
RBC # BLD: 2.99 M/UL (ref 4–5.2)
WBC # BLD: 16.1 K/UL (ref 4–11)

## 2020-05-07 PROCEDURE — 1220000000 HC SEMI PRIVATE OB R&B

## 2020-05-07 PROCEDURE — 2580000003 HC RX 258: Performed by: OBSTETRICS & GYNECOLOGY

## 2020-05-07 PROCEDURE — 85027 COMPLETE CBC AUTOMATED: CPT

## 2020-05-07 PROCEDURE — 6370000000 HC RX 637 (ALT 250 FOR IP): Performed by: OBSTETRICS & GYNECOLOGY

## 2020-05-07 PROCEDURE — 36415 COLL VENOUS BLD VENIPUNCTURE: CPT

## 2020-05-07 RX ADMIN — DOCUSATE SODIUM 100 MG: 100 CAPSULE, LIQUID FILLED ORAL at 10:01

## 2020-05-07 RX ADMIN — IBUPROFEN 800 MG: 800 TABLET ORAL at 15:45

## 2020-05-07 RX ADMIN — METFORMIN HYDROCHLORIDE 1 TABLET: 500 TABLET, EXTENDED RELEASE ORAL at 10:01

## 2020-05-07 RX ADMIN — IBUPROFEN 800 MG: 800 TABLET ORAL at 06:43

## 2020-05-07 RX ADMIN — Medication 10 ML: at 22:05

## 2020-05-07 RX ADMIN — FERROUS SULFATE TAB 325 MG (65 MG ELEMENTAL FE) 325 MG: 325 (65 FE) TAB at 10:01

## 2020-05-07 RX ADMIN — ACETAMINOPHEN 1000 MG: 500 TABLET ORAL at 21:07

## 2020-05-07 RX ADMIN — IBUPROFEN 800 MG: 800 TABLET ORAL at 23:42

## 2020-05-07 RX ADMIN — ACETAMINOPHEN 1000 MG: 500 TABLET ORAL at 04:13

## 2020-05-07 RX ADMIN — ACETAMINOPHEN 1000 MG: 500 TABLET ORAL at 12:32

## 2020-05-07 RX ADMIN — Medication 10 ML: at 10:01

## 2020-05-07 RX ADMIN — FERROUS SULFATE TAB 325 MG (65 MG ELEMENTAL FE) 325 MG: 325 (65 FE) TAB at 18:17

## 2020-05-07 ASSESSMENT — PAIN SCALES - GENERAL
PAINLEVEL_OUTOF10: 4
PAINLEVEL_OUTOF10: 3
PAINLEVEL_OUTOF10: 5
PAINLEVEL_OUTOF10: 4
PAINLEVEL_OUTOF10: 4
PAINLEVEL_OUTOF10: 5

## 2020-05-07 NOTE — PLAN OF CARE
Problem: Discharge Planning:  Goal: Discharged to appropriate level of care  Description: Discharged to appropriate level of care  Outcome: Ongoing     Problem: Fluid Volume - Imbalance:  Goal: Absence of postpartum hemorrhage signs and symptoms  Description: Absence of postpartum hemorrhage signs and symptoms  5/7/2020 0827 by Jeanie Love RN  Outcome: Ongoing  5/7/2020 0241 by Gerson Mejía RN  Outcome: Ongoing  Goal: Absence of imbalanced fluid volume signs and symptoms  Description: Absence of imbalanced fluid volume signs and symptoms  Outcome: Ongoing     Problem: Infection - Surgical Site:  Goal: Will show no infection signs and symptoms  Description: Will show no infection signs and symptoms  5/7/2020 0827 by Jeanie Love RN  Outcome: Ongoing  5/7/2020 0241 by Gerson Mejía RN  Outcome: Ongoing     Problem: Mood - Altered:  Goal: Mood stable  Description: Mood stable  Outcome: Ongoing     Problem: Nausea/Vomiting:  Goal: Absence of nausea/vomiting  Description: Absence of nausea/vomiting  5/7/2020 0827 by Jeanie Love RN  Outcome: Ongoing  5/7/2020 0241 by Gerson Mejía RN  Outcome: Ongoing     Problem: Pain - Acute:  Goal: Pain level will decrease  Description: Pain level will decrease  5/7/2020 0827 by Jeanie Love RN  Outcome: Ongoing  5/7/2020 0241 by Gerson Mejía RN  Outcome: Ongoing     Problem: Urinary Retention:  Goal: Urinary elimination within specified parameters  Description: Urinary elimination within specified parameters  Outcome: Ongoing     Problem: Venous Thromboembolism:  Goal: Will show no signs or symptoms of venous thromboembolism  Description: Will show no signs or symptoms of venous thromboembolism  Outcome: Ongoing  Goal: Absence of signs or symptoms of impaired coagulation  Description: Absence of signs or symptoms of impaired coagulation  5/7/2020 0827 by Jeanie Love RN  Outcome: Ongoing  5/7/2020 0241 by Gerson Mejía RN  Outcome: Ongoing

## 2020-05-07 NOTE — FLOWSHEET NOTE
This RN updated Dr. Shira Hooks in regards to pt H&H results this morning of 7.9/23. 8. Pt asymptomatic and tolerating ambulating to [facility].

## 2020-05-07 NOTE — PLAN OF CARE
Problem: Fluid Volume - Imbalance:  Goal: Absence of postpartum hemorrhage signs and symptoms  Description: Absence of postpartum hemorrhage signs and symptoms  Outcome: Ongoing     Problem: Infection - Surgical Site:  Goal: Will show no infection signs and symptoms  Description: Will show no infection signs and symptoms  Outcome: Ongoing     Problem: Nausea/Vomiting:  Goal: Absence of nausea/vomiting  Description: Absence of nausea/vomiting  Outcome: Ongoing     Problem: Pain - Acute:  Goal: Pain level will decrease  Description: Pain level will decrease  Outcome: Ongoing     Problem: Venous Thromboembolism:  Goal: Absence of signs or symptoms of impaired coagulation  Description: Absence of signs or symptoms of impaired coagulation  Outcome: Ongoing

## 2020-05-07 NOTE — LACTATION NOTE
This note was copied from a baby's chart. Lactation Progress Note      Data:   RN requesting [facility] assistance with 1/0 breast feeder whose baby is in SCN. Baby currently at breast but sleepy. Action: Encouraged mob to express colostrum. LC stimulated baby and baby began rooting and a good latch was achieved after few attempts with SRS and AS. Shield used for flat nipples but nipple noted to be ying after feed. Mob continues to pump Q 3 H to assistance with lactogenesis. BF education reviewed. Encouraged to call for f/u prn. Response: Pleased with feed. Verbalized and demonstrated understanding.

## 2020-05-08 VITALS
DIASTOLIC BLOOD PRESSURE: 83 MMHG | SYSTOLIC BLOOD PRESSURE: 131 MMHG | RESPIRATION RATE: 18 BRPM | WEIGHT: 250 LBS | TEMPERATURE: 98 F | HEART RATE: 80 BPM | BODY MASS INDEX: 41.65 KG/M2 | HEIGHT: 65 IN | OXYGEN SATURATION: 97 %

## 2020-05-08 PROCEDURE — 6370000000 HC RX 637 (ALT 250 FOR IP): Performed by: OBSTETRICS & GYNECOLOGY

## 2020-05-08 RX ORDER — PSEUDOEPHEDRINE HCL 30 MG
100 TABLET ORAL 2 TIMES DAILY PRN
Qty: 30 CAPSULE | Refills: 0 | Status: SHIPPED | OUTPATIENT
Start: 2020-05-08 | End: 2020-10-30 | Stop reason: ALTCHOICE

## 2020-05-08 RX ORDER — OXYCODONE HYDROCHLORIDE 5 MG/1
5 TABLET ORAL EVERY 6 HOURS PRN
Qty: 28 TABLET | Refills: 0 | Status: SHIPPED | OUTPATIENT
Start: 2020-05-08 | End: 2020-05-15

## 2020-05-08 RX ORDER — FERROUS SULFATE 325(65) MG
325 TABLET ORAL 2 TIMES DAILY WITH MEALS
Qty: 60 TABLET | Refills: 1 | Status: SHIPPED | OUTPATIENT
Start: 2020-05-08 | End: 2020-10-30 | Stop reason: ALTCHOICE

## 2020-05-08 RX ORDER — IBUPROFEN 800 MG/1
800 TABLET ORAL EVERY 8 HOURS SCHEDULED
Qty: 30 TABLET | Refills: 3 | Status: SHIPPED | OUTPATIENT
Start: 2020-05-08 | End: 2020-10-30 | Stop reason: ALTCHOICE

## 2020-05-08 RX ADMIN — IBUPROFEN 800 MG: 800 TABLET ORAL at 08:14

## 2020-05-08 RX ADMIN — FERROUS SULFATE TAB 325 MG (65 MG ELEMENTAL FE) 325 MG: 325 (65 FE) TAB at 09:44

## 2020-05-08 RX ADMIN — DOCUSATE SODIUM 100 MG: 100 CAPSULE, LIQUID FILLED ORAL at 09:44

## 2020-05-08 RX ADMIN — ACETAMINOPHEN 1000 MG: 500 TABLET ORAL at 05:00

## 2020-05-08 ASSESSMENT — PAIN SCALES - GENERAL
PAINLEVEL_OUTOF10: 3
PAINLEVEL_OUTOF10: 5

## 2020-05-08 NOTE — PROGRESS NOTES
Mom discharged. Infant remains in [facility] Nurs[facility]. Mom instructed to go to [facility] for discharge of infant. MOB in infant will be taken to private car together after infant discharge.

## 2020-05-08 NOTE — FLOWSHEET NOTE
Pt due for assessment but pt currently in SCN with infant. Will complete assessment when pt back to Room 384 after feeding infant.

## 2020-08-21 ENCOUNTER — EMPLOYEE WELLNESS (OUTPATIENT)
Dept: OTHER | Age: 30
End: 2020-08-21

## 2020-08-21 LAB
CHOLESTEROL, TOTAL: 175 MG/DL (ref 0–199)
GLUCOSE BLD-MCNC: 82 MG/DL (ref 70–99)
HDLC SERPL-MCNC: 46 MG/DL (ref 40–60)
LDL CHOLESTEROL CALCULATED: 99 MG/DL
TRIGL SERPL-MCNC: 149 MG/DL (ref 0–150)

## 2020-10-05 ENCOUNTER — HOSPITAL ENCOUNTER (OUTPATIENT)
Dept: CT IMAGING | Age: 30
Discharge: HOME | End: 2020-10-05
Payer: COMMERCIAL

## 2020-10-05 PROCEDURE — 74176 CT ABD & PELVIS W/O CONTRAST: CPT

## 2020-10-19 VITALS — WEIGHT: 229 LBS | BODY MASS INDEX: 38.11 KG/M2

## 2020-10-28 NOTE — PROGRESS NOTES
[facility] [address] [facility], [address]    Acknowledgment of Informed Consent for Surgical or Medical Procedure and Sedation  I agree to allow doctor(s) Pura Treviño and his/her associates or assistants, including residents and/or other qualified medical practitioner to perform the following medical treatment or procedure and to administer or direct the administration of sedation as necessary:  Procedure(s): LEFT PERCUTANEOUS NEPHROLITHOTOMY  My doctor has explained the following regarding the proposed procedure:   the explanation of the procedure   the benefits of the procedure   the potential problems that might occur during recuperation   the risks and side effects of the procedure which could include but are not limited to severe blood loss, infection, stroke or death   the benefits, risks and side effect of alternative procedures including the consequences of declining this procedure or any alternative procedures   the likelihood of achieving satisfactory results. I acknowledge no guarantee or assurance has been made to me regarding the results. I understand that during the course of this treatment/procedure, unforeseen conditions can occur which require an additional or different procedure. I agree to allow my physician or assistants to perform such extension of the original procedure as they may find necessary. I understand that sedation will often result in temporary impairment of memory and fine motor skills and that sedation can occasionally progress to a state of deep sedation or general anesthesia. I understand the risks of anesthesia for surgery include, but are not limited to, sore throat, hoarseness, injury to face, mouth, or teeth; nausea; headache; injury to blood vessels or nerves; death, brain damage, or paralysis.     I understand that if I have a Limitation of Treatment order in effect during my hospitalization, the order may or may

## 2020-10-29 ASSESSMENT — ENCOUNTER SYMPTOMS
CONSTIPATION: 0
VOMITING: 0
NAUSEA: 0
DIARRHEA: 0
EYE REDNESS: 0
SHORTNESS OF BREATH: 0

## 2020-10-30 ENCOUNTER — OFFICE VISIT (OUTPATIENT)
Dept: FAMILY MEDICINE CLINIC | Age: 30
End: 2020-10-30
Payer: COMMERCIAL

## 2020-10-30 VITALS
OXYGEN SATURATION: 99 % | WEIGHT: 234 LBS | HEIGHT: 66 IN | SYSTOLIC BLOOD PRESSURE: 124 MMHG | HEART RATE: 85 BPM | TEMPERATURE: 97.1 F | BODY MASS INDEX: 37.61 KG/M2 | DIASTOLIC BLOOD PRESSURE: 85 MMHG

## 2020-10-30 PROBLEM — Z3A.39 39 WEEKS GESTATION OF PREGNANCY: Status: RESOLVED | Noted: 2020-05-05 | Resolved: 2020-10-30

## 2020-10-30 PROBLEM — K76.0 FATTY INFILTRATION OF LIVER: Status: ACTIVE | Noted: 2020-10-30

## 2020-10-30 PROCEDURE — 99244 OFF/OP CNSLTJ NEW/EST MOD 40: CPT | Performed by: FAMILY MEDICINE

## 2020-10-30 PROCEDURE — 36415 COLL VENOUS BLD VENIPUNCTURE: CPT | Performed by: FAMILY MEDICINE

## 2020-10-30 ASSESSMENT — ENCOUNTER SYMPTOMS: BACK PAIN: 1

## 2020-10-30 NOTE — PROGRESS NOTES
Preoperative Consultation      Brijesh Cooper  YOB: 1990    Date of Service:  10/30/2020    Vitals:    10/30/20 1246   BP: 124/85   Site: Right Upper Arm   Position: Sitting   Cuff Size: Medium Adult   Pulse: 85   Temp: 97.1 °F (36.2 °C)   SpO2: 99%   Weight: 234 lb (106.1 kg)   Height: 5' 6\" (1.676 m)      Wt Readings from Last 2 Encounters:   10/30/20 234 lb (106.1 kg)   08/21/20 229 lb (103.9 kg)     BP Readings from Last 3 Encounters:   10/30/20 124/85   05/08/20 131/83   05/06/20 (!) 94/49          Allergies   Allergen Reactions    Sulfa Antibiotics      Outpatient Medications Marked as Taking for the 10/30/20 encounter (Office Visit) with Anne Perez, DO   Medication Sig Dispense Refill    Prenat-Fe Carbonyl-FA-Omega 3 (ONE-A-DAY WOMENS PRENATAL 1 PO)        Chief Complaint   Patient presents with    Pre-op Exam     Kidney stone removal/ [employer]/ 11/6/20/ Dr. Thong Sanders      This patient presents to the office today for a preoperative consultation at the request of surgeon, Dr. Thong Sanders  For a pre op for removal of left kidney stone on 11/6/20 at [facility].  She has had kidney stones in the past but was not having any particular pain in her back until recently when she was on the floor with her 10month-old child and had to get up from the floor and felt a twinge in her back. She had an x-ray which did show the kidney stone at that time. She is not in constant pain it is intermittent. . After her x-ray which showed the stone she did have a CT of the abdomen and pelvis this which showed the stone but also showed fatty infiltration of the liver which was new for her. Her Bmi is 37.7 today    Copy of CT below:  CT OF THE ABDOMEN AND PELVIS WITHOUT CONTRAST 10/5/2020 7:37 pm         TECHNIQUE:    CT of the abdomen and pelvis was performed without the administration of    intravenous contrast. Multiplanar reformatted images are provided for review.     Dose modulation, iterative reconstruction, and/or weight based adjustment of    the mA/kV was utilized to reduce the radiation dose to as low as reasonably    achievable.         COMPARISON:    None.         HISTORY:    ORDERING SYSTEM PROVIDED HISTORY: Calculus of kidney    TECHNOLOGIST PROVIDED HISTORY:    Additional Contrast?->None    Is the patient pregnant?->No    Reason for Exam: Kidney stones were seen on her left side on a recent lumbar    x-ray; currently no discomfort    Acuity: Acute    Type of Exam: Initial    Relevant Medical/Surgical History: hx of kidney stones         FINDINGS:    Lower Chest: Unremarkable.         Organs: Fatty liver.  Gallbladder is unremarkable without biliary ductal    dilatation.  Pancreas is unremarkable.  Adrenals are unremarkable.  Spleen is    normal in size.  Evolving 1.9 cm staghorn calculus in the left renal pelvis    with adjacent urothelial thickening.  There are additional bilateral calyceal    calculi.  No hydronephrosis. .  Vasculature is unremarkable.         GI/Bowel: Bowel is non-dilated without wall thickening.  Appendix is normal.         Pelvis: Unremarkable.         Peritoneum/Retroperitoneum:No free fluid, free air, organized fluid    collection or lymphadenopathy.         Bones: Multilevel degenerative disc disease.              Impression    1. Evolving 1.9 cm staghorn calculus in the left renal pelvis with adjacent    urothelial thickening. There are additional bilateral calyceal calculi. No    hydronephrosis. 2. Fatty liver.                    Patient Active Problem List   Diagnosis     delivery delivered    Fatty infiltration of liver       History reviewed. No pertinent past medical history.   Past Surgical History:   Procedure Laterality Date    ADENOIDECTOMY       SECTION N/A 2020     SECTION performed by [facility]ir Leger [facility] at [facility] OR    KIDNEY STONE SURGERY      KNEE SURGERY       Family History   Problem Relation Age of Onset    High Blood Pressure Mother     High Cholesterol Mother      Social History     Socioeconomic History    Marital status:      Spouse name: Not on file    Number of children: Not on file    Years of education: Not on file    Highest education level: Not on file   Occupational History    Not on file   Social Needs    Financial resource strain: Not on file    Food insecurity     Worry: Not on file     Inability: Not on file    Transportation needs     Medical: Not on file     Non-medical: Not on file   Tobacco Use    Smoking status: Never Smoker    Smokeless tobacco: Never Used   Substance and Sexual Activity    Alcohol use: Not Currently    Drug use: Never    Sexual activity: Yes     Partners: Male   Lifestyle    Physical activity     Days per week: Not on file     Minutes per session: Not on file    Stress: Not on file   Relationships    Social connections     Talks on phone: Not on file     Gets together: Not on file     Attends Buddhist service: Not on file     Active member of club or organization: Not on file     Attends meetings of clubs or organizations: Not on file     Relationship status: Not on file    Intimate partner violence     Fear of current or ex partner: Not on file     Emotionally abused: Not on file     Physically abused: Not on file     Forced sexual activity: Not on file   Other Topics Concern    Not on file   Social History Narrative    Not on file       Review of Systems  Review of Systems   Constitutional: Negative for unexpected weight change. HENT: Negative. Eyes: Negative for redness. Respiratory: Negative for shortness of breath. Cardiovascular: Negative for chest pain and leg swelling. Gastrointestinal: Negative for constipation, diarrhea, nausea and vomiting. Genitourinary: Positive for flank pain. Negative for decreased urine volume, pelvic pain and urgency. Musculoskeletal: Positive for back pain. Skin: Negative for pallor.    Allergic/Immunologic: Negative for immunocompromised state. Psychiatric/Behavioral: Negative for confusion. All other systems reviewed and are negative. Physical Exam  Vitals signs and nursing note reviewed. Constitutional:       General: She is not in acute distress. Appearance: Normal appearance. She is well-developed. She is not ill-appearing, toxic-appearing or diaphoretic. HENT:      Head: Normocephalic and atraumatic. Eyes:      General: No scleral icterus. Right eye: No discharge. Left eye: No discharge. Conjunctiva/sclera: Conjunctivae normal.   Neck:      Musculoskeletal: Neck supple. No neck rigidity. Cardiovascular:      Rate and Rhythm: Normal rate and regular rhythm. Heart sounds: Normal heart sounds. Pulmonary:      Effort: Pulmonary effort is normal. No respiratory distress. Breath sounds: Normal breath sounds. No stridor. Abdominal:      General: Bowel sounds are normal.      Palpations: Abdomen is soft. There is no mass. Tenderness: There is no right CVA tenderness or left CVA tenderness. Musculoskeletal:      Right lower leg: No edema. Left lower leg: No edema. Skin:     General: Skin is warm and dry. Coloration: Skin is not pale. Findings: No erythema or rash. Neurological:      Mental Status: She is alert and oriented to person, place, and time. Psychiatric:         Mood and Affect: Mood normal.         Behavior: Behavior normal.         Thought Content: Thought content normal.         Judgment: Judgment normal.              Assessment/Plan:       27 y.o. patient with planned surgery as above. Diagnosis Orders   1. Pre-op exam  Comprehensive Metabolic Panel   2. Kidney stone on left side  Comprehensive Metabolic Panel   3.  Fatty infiltration of liver  Comprehensive Metabolic Panel     Kenya Pop was seen today for pre-op exam.    Diagnoses and all orders for this visit:    Pre-op exam  -     Comprehensive Metabolic Panel    Kidney stone on left side      Fatty infiltration of liver  I discussed this finding which was new information for her from the CT scan. Her mother does have that as well. Told her losing weight is the best treatment for it and we will go ahead and see what the liver enzyme shows in the CMP for the preop. Pre op completed,   Cleared for surgery  Copy of this pre-op consultationwas sent to the surgeon via [email or web address], when in the [address] [place] [address] Rd system, as well as faxed to surgical pre op dept., and given to the patient to take with them on the day of surgery. Filled out form,see form.

## 2020-10-31 LAB
A/G RATIO: 1.8 (ref 1.1–2.2)
ALBUMIN SERPL-MCNC: 4.5 G/DL (ref 3.4–5)
ALP BLD-CCNC: 83 U/L (ref 40–129)
ALT SERPL-CCNC: 43 U/L (ref 10–40)
ANION GAP SERPL CALCULATED.3IONS-SCNC: 12 MMOL/L (ref 3–16)
AST SERPL-CCNC: 23 U/L (ref 15–37)
BILIRUB SERPL-MCNC: 0.4 MG/DL (ref 0–1)
BUN BLDV-MCNC: 17 MG/DL (ref 7–20)
CALCIUM SERPL-MCNC: 10 MG/DL (ref 8.3–10.6)
CHLORIDE BLD-SCNC: 102 MMOL/L (ref 99–110)
CO2: 26 MMOL/L (ref 21–32)
CREAT SERPL-MCNC: 0.7 MG/DL (ref 0.6–1.1)
GFR AFRICAN AMERICAN: >60
GFR NON-AFRICAN AMERICAN: >60
GLOBULIN: 2.5 G/DL
GLUCOSE BLD-MCNC: 119 MG/DL (ref 70–99)
POTASSIUM SERPL-SCNC: 4.1 MMOL/L (ref 3.5–5.1)
SODIUM BLD-SCNC: 140 MMOL/L (ref 136–145)
TOTAL PROTEIN: 7 G/DL (ref 6.4–8.2)

## 2020-11-02 ENCOUNTER — NURSE ONLY (OUTPATIENT)
Dept: PRIMARY CARE CLINIC | Age: 30
End: 2020-11-02
Payer: COMMERCIAL

## 2020-11-02 PROCEDURE — 99211 OFF/OP EST MAY X REQ PHY/QHP: CPT | Performed by: NURSE PRACTITIONER

## 2020-11-03 LAB — SARS-COV-2: NOT DETECTED

## 2020-11-03 NOTE — RESULT ENCOUNTER NOTE
Your Covid-19 test resulted not detected/negative. What happens if I have a negative test?  Remember to wash your hands often, avoid touching your face, stay 6 feet from people you do not live with, and wear a cloth facemask when you go out in public. A negative COVID-19 test at one point in time does not mean you will stay negative. You could become ill with COVID-19 and/or test positive at any time. If you are a close contact of a confirmed or suspected case, continue to stay home and away from others until 14 days after your last exposure. If you do not have symptoms and were not in close contact with a confirmed or suspected case, you can stop isolating. If you currently have symptoms of COVID-19 and were not in close contact with a confirmed or suspected case, you should keep monitoring symptoms and talk to your doctor or other healthcare provider about staying home and if you need to get tested again. If you develop symptoms of COVID-19, stay at home and away from others and talk to your doctor or other healthcare provider about getting tested again. For additional information, visit coronavirus. [place].gov. For answers to your COVID-19 questions, call 8-079-2-ASK-[facility] ([phone]).

## 2020-11-04 NOTE — PROGRESS NOTES

## 2020-11-05 ENCOUNTER — ANESTHESIA EVENT (OUTPATIENT)
Dept: OPERATING ROOM | Age: 30
End: 2020-11-05
Payer: COMMERCIAL

## 2020-11-06 ENCOUNTER — HOSPITAL ENCOUNTER (OUTPATIENT)
Age: 30
Setting detail: OBSERVATION
Discharge: HOME | End: 2020-11-08
Attending: UROLOGY | Admitting: UROLOGY
Payer: COMMERCIAL

## 2020-11-06 ENCOUNTER — HOSPITAL ENCOUNTER (OUTPATIENT)
Dept: INTERVENTIONAL RADIOLOGY/VASCULAR | Age: 30
Discharge: HOME | End: 2020-11-06
Payer: COMMERCIAL

## 2020-11-06 ENCOUNTER — ANESTHESIA (OUTPATIENT)
Dept: OPERATING ROOM | Age: 30
End: 2020-11-06
Payer: COMMERCIAL

## 2020-11-06 VITALS
OXYGEN SATURATION: 97 % | DIASTOLIC BLOOD PRESSURE: 71 MMHG | SYSTOLIC BLOOD PRESSURE: 125 MMHG | RESPIRATION RATE: 23 BRPM

## 2020-11-06 PROBLEM — N20.0 KIDNEY STONE: Status: ACTIVE | Noted: 2020-11-06

## 2020-11-06 LAB
ABO/RH: NORMAL
ANTIBODY SCREEN: NORMAL
APTT: 33 SEC (ref 24.2–36.2)
BASOPHILS ABSOLUTE: 0.1 K/UL (ref 0–0.2)
BASOPHILS RELATIVE PERCENT: 0.7 %
EOSINOPHILS ABSOLUTE: 0.6 K/UL (ref 0–0.6)
EOSINOPHILS RELATIVE PERCENT: 6.5 %
HCT VFR BLD CALC: 38.6 % (ref 36–48)
HEMOGLOBIN: 12.6 G/DL (ref 12–16)
INR BLD: 1.05 (ref 0.86–1.14)
LYMPHOCYTES ABSOLUTE: 3.3 K/UL (ref 1–5.1)
LYMPHOCYTES RELATIVE PERCENT: 35.8 %
MCH RBC QN AUTO: 26.8 PG (ref 26–34)
MCHC RBC AUTO-ENTMCNC: 32.6 G/DL (ref 31–36)
MCV RBC AUTO: 82.2 FL (ref 80–100)
MONOCYTES ABSOLUTE: 0.6 K/UL (ref 0–1.3)
MONOCYTES RELATIVE PERCENT: 6.6 %
NEUTROPHILS ABSOLUTE: 4.7 K/UL (ref 1.7–7.7)
NEUTROPHILS RELATIVE PERCENT: 50.4 %
PDW BLD-RTO: 15 % (ref 12.4–15.4)
PLATELET # BLD: 313 K/UL (ref 135–450)
PMV BLD AUTO: 7.7 FL (ref 5–10.5)
PREGNANCY, URINE: NEGATIVE
PROTHROMBIN TIME: 12.2 SEC (ref 10–13.2)
RBC # BLD: 4.7 M/UL (ref 4–5.2)
WBC # BLD: 9.3 K/UL (ref 4–11)

## 2020-11-06 PROCEDURE — 2700000000 HC OXYGEN THERAPY PER DAY

## 2020-11-06 PROCEDURE — 3700000000 HC ANESTHESIA ATTENDED CARE: Performed by: UROLOGY

## 2020-11-06 PROCEDURE — C1769 GUIDE WIRE: HCPCS

## 2020-11-06 PROCEDURE — 7100000001 HC PACU RECOVERY - ADDTL 15 MIN: Performed by: UROLOGY

## 2020-11-06 PROCEDURE — 2720000010 HC SURG SUPPLY STERILE: Performed by: UROLOGY

## 2020-11-06 PROCEDURE — 85730 THROMBOPLASTIN TIME PARTIAL: CPT

## 2020-11-06 PROCEDURE — 2580000003 HC RX 258: Performed by: ANESTHESIOLOGY

## 2020-11-06 PROCEDURE — 86900 BLOOD TYPING SEROLOGIC ABO: CPT

## 2020-11-06 PROCEDURE — 6360000002 HC RX W HCPCS

## 2020-11-06 PROCEDURE — 3600000014 HC SURGERY LEVEL 4 ADDTL 15MIN: Performed by: UROLOGY

## 2020-11-06 PROCEDURE — 2580000003 HC RX 258: Performed by: UROLOGY

## 2020-11-06 PROCEDURE — 6360000002 HC RX W HCPCS: Performed by: ANESTHESIOLOGY

## 2020-11-06 PROCEDURE — 3600000004 HC SURGERY LEVEL 4 BASE: Performed by: UROLOGY

## 2020-11-06 PROCEDURE — C1894 INTRO/SHEATH, NON-LASER: HCPCS

## 2020-11-06 PROCEDURE — 82365 CALCULUS SPECTROSCOPY: CPT

## 2020-11-06 PROCEDURE — 7100000000 HC PACU RECOVERY - FIRST 15 MIN: Performed by: UROLOGY

## 2020-11-06 PROCEDURE — 86850 RBC ANTIBODY SCREEN: CPT

## 2020-11-06 PROCEDURE — 6360000002 HC RX W HCPCS: Performed by: NURSE ANESTHETIST, CERTIFIED REGISTERED

## 2020-11-06 PROCEDURE — C1887 CATHETER, GUIDING: HCPCS

## 2020-11-06 PROCEDURE — C1729 CATH, DRAINAGE: HCPCS

## 2020-11-06 PROCEDURE — 3700000001 HC ADD 15 MINUTES (ANESTHESIA): Performed by: UROLOGY

## 2020-11-06 PROCEDURE — 84703 CHORIONIC GONADOTROPIN ASSAY: CPT

## 2020-11-06 PROCEDURE — 2500000003 HC RX 250 WO HCPCS

## 2020-11-06 PROCEDURE — 85610 PROTHROMBIN TIME: CPT

## 2020-11-06 PROCEDURE — 2709999900 HC NON-CHARGEABLE SUPPLY: Performed by: UROLOGY

## 2020-11-06 PROCEDURE — 88300 SURGICAL PATH GROSS: CPT

## 2020-11-06 PROCEDURE — 6360000002 HC RX W HCPCS: Performed by: UROLOGY

## 2020-11-06 PROCEDURE — 6360000004 HC RX CONTRAST MEDICATION: Performed by: RADIOLOGY

## 2020-11-06 PROCEDURE — 94761 N-INVAS EAR/PLS OXIMETRY MLT: CPT

## 2020-11-06 PROCEDURE — 2500000003 HC RX 250 WO HCPCS: Performed by: NURSE ANESTHETIST, CERTIFIED REGISTERED

## 2020-11-06 PROCEDURE — 50437 DILAT XST TRC NEW ACCESS RCS: CPT | Performed by: RADIOLOGY

## 2020-11-06 PROCEDURE — 85025 COMPLETE CBC W/AUTO DIFF WBC: CPT

## 2020-11-06 PROCEDURE — G0378 HOSPITAL OBSERVATION PER HR: HCPCS

## 2020-11-06 PROCEDURE — 2580000003 HC RX 258: Performed by: NURSE ANESTHETIST, CERTIFIED REGISTERED

## 2020-11-06 PROCEDURE — 86901 BLOOD TYPING SEROLOGIC RH(D): CPT

## 2020-11-06 PROCEDURE — 2709999900 HC NON-CHARGEABLE SUPPLY

## 2020-11-06 PROCEDURE — 6370000000 HC RX 637 (ALT 250 FOR IP): Performed by: UROLOGY

## 2020-11-06 RX ORDER — ROCURONIUM BROMIDE 10 MG/ML
INJECTION, SOLUTION INTRAVENOUS PRN
Status: DISCONTINUED | OUTPATIENT
Start: 2020-11-06 | End: 2020-11-06 | Stop reason: SDUPTHER

## 2020-11-06 RX ORDER — LABETALOL 20 MG/4 ML (5 MG/ML) INTRAVENOUS SYRINGE
5 EVERY 10 MIN PRN
Status: DISCONTINUED | OUTPATIENT
Start: 2020-11-06 | End: 2020-11-06 | Stop reason: HOSPADM

## 2020-11-06 RX ORDER — HYDRALAZINE HYDROCHLORIDE 20 MG/ML
5 INJECTION INTRAMUSCULAR; INTRAVENOUS EVERY 10 MIN PRN
Status: DISCONTINUED | OUTPATIENT
Start: 2020-11-06 | End: 2020-11-06 | Stop reason: HOSPADM

## 2020-11-06 RX ORDER — SODIUM CHLORIDE 0.9 % (FLUSH) 0.9 %
10 SYRINGE (ML) INJECTION PRN
Status: DISCONTINUED | OUTPATIENT
Start: 2020-11-06 | End: 2020-11-06 | Stop reason: HOSPADM

## 2020-11-06 RX ORDER — SODIUM CHLORIDE 0.9 % (FLUSH) 0.9 %
10 SYRINGE (ML) INJECTION PRN
Status: DISCONTINUED | OUTPATIENT
Start: 2020-11-06 | End: 2020-11-08 | Stop reason: HOSPADM

## 2020-11-06 RX ORDER — SODIUM CHLORIDE, SODIUM LACTATE, POTASSIUM CHLORIDE, CALCIUM CHLORIDE 600; 310; 30; 20 MG/100ML; MG/100ML; MG/100ML; MG/100ML
INJECTION, SOLUTION INTRAVENOUS CONTINUOUS
Status: DISCONTINUED | OUTPATIENT
Start: 2020-11-06 | End: 2020-11-06

## 2020-11-06 RX ORDER — SODIUM CHLORIDE 0.9 % (FLUSH) 0.9 %
10 SYRINGE (ML) INJECTION EVERY 12 HOURS SCHEDULED
Status: DISCONTINUED | OUTPATIENT
Start: 2020-11-06 | End: 2020-11-06 | Stop reason: HOSPADM

## 2020-11-06 RX ORDER — PROPOFOL 10 MG/ML
INJECTION, EMULSION INTRAVENOUS PRN
Status: DISCONTINUED | OUTPATIENT
Start: 2020-11-06 | End: 2020-11-06 | Stop reason: SDUPTHER

## 2020-11-06 RX ORDER — SODIUM CHLORIDE, SODIUM LACTATE, POTASSIUM CHLORIDE, CALCIUM CHLORIDE 600; 310; 30; 20 MG/100ML; MG/100ML; MG/100ML; MG/100ML
INJECTION, SOLUTION INTRAVENOUS CONTINUOUS PRN
Status: DISCONTINUED | OUTPATIENT
Start: 2020-11-06 | End: 2020-11-06 | Stop reason: SDUPTHER

## 2020-11-06 RX ORDER — MAGNESIUM HYDROXIDE 1200 MG/15ML
LIQUID ORAL CONTINUOUS PRN
Status: COMPLETED | OUTPATIENT
Start: 2020-11-06 | End: 2020-11-06

## 2020-11-06 RX ORDER — LIDOCAINE HYDROCHLORIDE 10 MG/ML
INJECTION, SOLUTION EPIDURAL; INFILTRATION; INTRACAUDAL; PERINEURAL PRN
Status: DISCONTINUED | OUTPATIENT
Start: 2020-11-06 | End: 2020-11-06 | Stop reason: SDUPTHER

## 2020-11-06 RX ORDER — ONDANSETRON 2 MG/ML
4 INJECTION INTRAMUSCULAR; INTRAVENOUS
Status: COMPLETED | OUTPATIENT
Start: 2020-11-06 | End: 2020-11-06

## 2020-11-06 RX ORDER — ONDANSETRON 2 MG/ML
INJECTION INTRAMUSCULAR; INTRAVENOUS PRN
Status: DISCONTINUED | OUTPATIENT
Start: 2020-11-06 | End: 2020-11-06 | Stop reason: SDUPTHER

## 2020-11-06 RX ORDER — OXYCODONE HYDROCHLORIDE AND ACETAMINOPHEN 5; 325 MG/1; MG/1
2 TABLET ORAL PRN
Status: DISCONTINUED | OUTPATIENT
Start: 2020-11-06 | End: 2020-11-06 | Stop reason: HOSPADM

## 2020-11-06 RX ORDER — MEPERIDINE HYDROCHLORIDE 25 MG/ML
12.5 INJECTION INTRAMUSCULAR; INTRAVENOUS; SUBCUTANEOUS EVERY 5 MIN PRN
Status: DISCONTINUED | OUTPATIENT
Start: 2020-11-06 | End: 2020-11-06 | Stop reason: HOSPADM

## 2020-11-06 RX ORDER — SODIUM CHLORIDE 9 MG/ML
INJECTION, SOLUTION INTRAVENOUS CONTINUOUS
Status: DISCONTINUED | OUTPATIENT
Start: 2020-11-06 | End: 2020-11-06

## 2020-11-06 RX ORDER — SODIUM CHLORIDE 0.9 % (FLUSH) 0.9 %
10 SYRINGE (ML) INJECTION EVERY 12 HOURS SCHEDULED
Status: DISCONTINUED | OUTPATIENT
Start: 2020-11-06 | End: 2020-11-08 | Stop reason: HOSPADM

## 2020-11-06 RX ORDER — PROCHLORPERAZINE EDISYLATE 5 MG/ML
5 INJECTION INTRAMUSCULAR; INTRAVENOUS
Status: DISCONTINUED | OUTPATIENT
Start: 2020-11-06 | End: 2020-11-06 | Stop reason: HOSPADM

## 2020-11-06 RX ORDER — MORPHINE SULFATE 2 MG/ML
2 INJECTION, SOLUTION INTRAMUSCULAR; INTRAVENOUS
Status: DISCONTINUED | OUTPATIENT
Start: 2020-11-06 | End: 2020-11-08 | Stop reason: HOSPADM

## 2020-11-06 RX ORDER — SODIUM CHLORIDE 9 MG/ML
INJECTION, SOLUTION INTRAVENOUS CONTINUOUS
Status: DISCONTINUED | OUTPATIENT
Start: 2020-11-06 | End: 2020-11-07

## 2020-11-06 RX ORDER — HYDROCODONE BITARTRATE AND ACETAMINOPHEN 5; 325 MG/1; MG/1
1 TABLET ORAL EVERY 4 HOURS PRN
Status: DISCONTINUED | OUTPATIENT
Start: 2020-11-06 | End: 2020-11-08 | Stop reason: HOSPADM

## 2020-11-06 RX ORDER — FENTANYL CITRATE 50 UG/ML
INJECTION, SOLUTION INTRAMUSCULAR; INTRAVENOUS PRN
Status: DISCONTINUED | OUTPATIENT
Start: 2020-11-06 | End: 2020-11-06 | Stop reason: SDUPTHER

## 2020-11-06 RX ORDER — ONDANSETRON 4 MG/1
4 TABLET, ORALLY DISINTEGRATING ORAL EVERY 8 HOURS PRN
Status: DISCONTINUED | OUTPATIENT
Start: 2020-11-06 | End: 2020-11-08 | Stop reason: HOSPADM

## 2020-11-06 RX ORDER — DIPHENHYDRAMINE HYDROCHLORIDE 50 MG/ML
12.5 INJECTION INTRAMUSCULAR; INTRAVENOUS
Status: DISCONTINUED | OUTPATIENT
Start: 2020-11-06 | End: 2020-11-06 | Stop reason: HOSPADM

## 2020-11-06 RX ORDER — MORPHINE SULFATE 2 MG/ML
4 INJECTION, SOLUTION INTRAMUSCULAR; INTRAVENOUS
Status: DISCONTINUED | OUTPATIENT
Start: 2020-11-06 | End: 2020-11-08 | Stop reason: HOSPADM

## 2020-11-06 RX ORDER — OXYCODONE HYDROCHLORIDE AND ACETAMINOPHEN 5; 325 MG/1; MG/1
1 TABLET ORAL PRN
Status: DISCONTINUED | OUTPATIENT
Start: 2020-11-06 | End: 2020-11-06 | Stop reason: HOSPADM

## 2020-11-06 RX ORDER — CIPROFLOXACIN 2 MG/ML
400 INJECTION, SOLUTION INTRAVENOUS ONCE
Status: COMPLETED | OUTPATIENT
Start: 2020-11-06 | End: 2020-11-06

## 2020-11-06 RX ORDER — FENTANYL CITRATE 50 UG/ML
50 INJECTION, SOLUTION INTRAMUSCULAR; INTRAVENOUS EVERY 5 MIN PRN
Status: DISCONTINUED | OUTPATIENT
Start: 2020-11-06 | End: 2020-11-06 | Stop reason: HOSPADM

## 2020-11-06 RX ORDER — ONDANSETRON 2 MG/ML
4 INJECTION INTRAMUSCULAR; INTRAVENOUS EVERY 6 HOURS PRN
Status: DISCONTINUED | OUTPATIENT
Start: 2020-11-06 | End: 2020-11-08 | Stop reason: HOSPADM

## 2020-11-06 RX ORDER — MIDAZOLAM HYDROCHLORIDE 1 MG/ML
INJECTION INTRAMUSCULAR; INTRAVENOUS PRN
Status: DISCONTINUED | OUTPATIENT
Start: 2020-11-06 | End: 2020-11-06 | Stop reason: SDUPTHER

## 2020-11-06 RX ORDER — DEXAMETHASONE SODIUM PHOSPHATE 4 MG/ML
INJECTION, SOLUTION INTRA-ARTICULAR; INTRALESIONAL; INTRAMUSCULAR; INTRAVENOUS; SOFT TISSUE PRN
Status: DISCONTINUED | OUTPATIENT
Start: 2020-11-06 | End: 2020-11-06 | Stop reason: SDUPTHER

## 2020-11-06 RX ORDER — FENTANYL CITRATE 50 UG/ML
25 INJECTION, SOLUTION INTRAMUSCULAR; INTRAVENOUS EVERY 5 MIN PRN
Status: DISCONTINUED | OUTPATIENT
Start: 2020-11-06 | End: 2020-11-06 | Stop reason: HOSPADM

## 2020-11-06 RX ADMIN — ONDANSETRON 4 MG: 2 INJECTION INTRAMUSCULAR; INTRAVENOUS at 18:10

## 2020-11-06 RX ADMIN — SODIUM CHLORIDE, SODIUM LACTATE, POTASSIUM CHLORIDE, AND CALCIUM CHLORIDE: 600; 310; 30; 20 INJECTION, SOLUTION INTRAVENOUS at 11:45

## 2020-11-06 RX ADMIN — CIPROFLOXACIN 400 MG: 2 INJECTION, SOLUTION INTRAVENOUS at 12:57

## 2020-11-06 RX ADMIN — PROPOFOL 150 MG: 10 INJECTION, EMULSION INTRAVENOUS at 12:52

## 2020-11-06 RX ADMIN — HYDROCODONE BITARTRATE AND ACETAMINOPHEN 1 TABLET: 5; 325 TABLET ORAL at 22:19

## 2020-11-06 RX ADMIN — IOHEXOL 100 ML: 350 INJECTION, SOLUTION INTRAVENOUS at 14:59

## 2020-11-06 RX ADMIN — SODIUM CHLORIDE, SODIUM LACTATE, POTASSIUM CHLORIDE, AND CALCIUM CHLORIDE: 600; 310; 30; 20 INJECTION, SOLUTION INTRAVENOUS at 14:47

## 2020-11-06 RX ADMIN — HYDROMORPHONE HYDROCHLORIDE 0.5 MG: 1 INJECTION, SOLUTION INTRAMUSCULAR; INTRAVENOUS; SUBCUTANEOUS at 14:42

## 2020-11-06 RX ADMIN — ROCURONIUM BROMIDE 50 MG: 10 INJECTION INTRAVENOUS at 12:52

## 2020-11-06 RX ADMIN — HYDROMORPHONE HYDROCHLORIDE 0.5 MG: 1 INJECTION, SOLUTION INTRAMUSCULAR; INTRAVENOUS; SUBCUTANEOUS at 17:59

## 2020-11-06 RX ADMIN — SODIUM CHLORIDE, POTASSIUM CHLORIDE, SODIUM LACTATE AND CALCIUM CHLORIDE: 600; 310; 30; 20 INJECTION, SOLUTION INTRAVENOUS at 11:52

## 2020-11-06 RX ADMIN — MIDAZOLAM HYDROCHLORIDE 2 MG: 2 INJECTION, SOLUTION INTRAMUSCULAR; INTRAVENOUS at 12:48

## 2020-11-06 RX ADMIN — FENTANYL CITRATE 50 MCG: 50 INJECTION INTRAMUSCULAR; INTRAVENOUS at 13:43

## 2020-11-06 RX ADMIN — DEXAMETHASONE SODIUM PHOSPHATE 8 MG: 4 INJECTION, SOLUTION INTRAMUSCULAR; INTRAVENOUS at 13:23

## 2020-11-06 RX ADMIN — ONDANSETRON 4 MG: 2 INJECTION INTRAMUSCULAR; INTRAVENOUS at 13:24

## 2020-11-06 RX ADMIN — SODIUM CHLORIDE: 9 INJECTION, SOLUTION INTRAVENOUS at 20:10

## 2020-11-06 RX ADMIN — HYDROMORPHONE HYDROCHLORIDE 0.5 MG: 1 INJECTION, SOLUTION INTRAMUSCULAR; INTRAVENOUS; SUBCUTANEOUS at 15:16

## 2020-11-06 RX ADMIN — PROPOFOL 50 MG: 10 INJECTION, EMULSION INTRAVENOUS at 13:03

## 2020-11-06 RX ADMIN — LIDOCAINE HYDROCHLORIDE 100 MG: 10 INJECTION, SOLUTION EPIDURAL; INFILTRATION; INTRACAUDAL; PERINEURAL at 12:52

## 2020-11-06 RX ADMIN — FENTANYL CITRATE 100 MCG: 50 INJECTION INTRAMUSCULAR; INTRAVENOUS at 12:50

## 2020-11-06 RX ADMIN — FENTANYL CITRATE 50 MCG: 50 INJECTION INTRAMUSCULAR; INTRAVENOUS at 13:58

## 2020-11-06 ASSESSMENT — PULMONARY FUNCTION TESTS
PIF_VALUE: 25
PIF_VALUE: 26
PIF_VALUE: 25
PIF_VALUE: 25
PIF_VALUE: 5
PIF_VALUE: 25
PIF_VALUE: 24
PIF_VALUE: 25
PIF_VALUE: 7
PIF_VALUE: 27
PIF_VALUE: 26
PIF_VALUE: 24
PIF_VALUE: 25
PIF_VALUE: 26
PIF_VALUE: 6
PIF_VALUE: 25
PIF_VALUE: 26
PIF_VALUE: 25
PIF_VALUE: 25
PIF_VALUE: 26
PIF_VALUE: 25
PIF_VALUE: 30
PIF_VALUE: 25
PIF_VALUE: 25
PIF_VALUE: 26
PIF_VALUE: 25
PIF_VALUE: 6
PIF_VALUE: 25
PIF_VALUE: 25
PIF_VALUE: 29
PIF_VALUE: 25
PIF_VALUE: 3
PIF_VALUE: 25
PIF_VALUE: 26
PIF_VALUE: 25
PIF_VALUE: 25
PIF_VALUE: 26
PIF_VALUE: 25
PIF_VALUE: 26
PIF_VALUE: 25
PIF_VALUE: 5
PIF_VALUE: 25
PIF_VALUE: 35
PIF_VALUE: 26
PIF_VALUE: 26
PIF_VALUE: 29
PIF_VALUE: 25
PIF_VALUE: 26
PIF_VALUE: 25
PIF_VALUE: 26
PIF_VALUE: 25
PIF_VALUE: 16
PIF_VALUE: 5
PIF_VALUE: 26
PIF_VALUE: 25
PIF_VALUE: 16
PIF_VALUE: 25
PIF_VALUE: 26
PIF_VALUE: 25
PIF_VALUE: 25
PIF_VALUE: 27
PIF_VALUE: 25
PIF_VALUE: 24
PIF_VALUE: 16
PIF_VALUE: 25
PIF_VALUE: 25
PIF_VALUE: 26
PIF_VALUE: 26
PIF_VALUE: 25
PIF_VALUE: 26
PIF_VALUE: 25
PIF_VALUE: 24
PIF_VALUE: 25
PIF_VALUE: 25
PIF_VALUE: 26
PIF_VALUE: 25
PIF_VALUE: 26
PIF_VALUE: 25
PIF_VALUE: 25
PIF_VALUE: 24
PIF_VALUE: 25
PIF_VALUE: 27
PIF_VALUE: 24
PIF_VALUE: 25
PIF_VALUE: 26
PIF_VALUE: 25
PIF_VALUE: 27
PIF_VALUE: 26
PIF_VALUE: 25
PIF_VALUE: 17
PIF_VALUE: 25
PIF_VALUE: 25
PIF_VALUE: 26
PIF_VALUE: 25
PIF_VALUE: 24
PIF_VALUE: 25
PIF_VALUE: 25
PIF_VALUE: 27
PIF_VALUE: 25
PIF_VALUE: 25
PIF_VALUE: 5
PIF_VALUE: 25
PIF_VALUE: 25
PIF_VALUE: 26
PIF_VALUE: 25
PIF_VALUE: 27
PIF_VALUE: 25
PIF_VALUE: 4
PIF_VALUE: 25
PIF_VALUE: 26
PIF_VALUE: 25
PIF_VALUE: 32
PIF_VALUE: 25
PIF_VALUE: 26
PIF_VALUE: 26
PIF_VALUE: 24
PIF_VALUE: 25
PIF_VALUE: 26
PIF_VALUE: 25
PIF_VALUE: 24
PIF_VALUE: 25
PIF_VALUE: 27
PIF_VALUE: 25
PIF_VALUE: 25
PIF_VALUE: 26
PIF_VALUE: 25
PIF_VALUE: 25
PIF_VALUE: 26
PIF_VALUE: 26
PIF_VALUE: 25
PIF_VALUE: 26
PIF_VALUE: 25
PIF_VALUE: 5
PIF_VALUE: 2
PIF_VALUE: 25
PIF_VALUE: 24
PIF_VALUE: 25
PIF_VALUE: 27
PIF_VALUE: 25
PIF_VALUE: 26
PIF_VALUE: 25
PIF_VALUE: 25
PIF_VALUE: 26
PIF_VALUE: 25
PIF_VALUE: 27
PIF_VALUE: 25
PIF_VALUE: 26
PIF_VALUE: 25
PIF_VALUE: 25
PIF_VALUE: 26
PIF_VALUE: 4
PIF_VALUE: 25
PIF_VALUE: 26
PIF_VALUE: 25
PIF_VALUE: 24
PIF_VALUE: 25
PIF_VALUE: 26
PIF_VALUE: 25

## 2020-11-06 ASSESSMENT — PAIN DESCRIPTION - ONSET
ONSET: ON-GOING
ONSET: ON-GOING

## 2020-11-06 ASSESSMENT — PAIN SCALES - GENERAL
PAINLEVEL_OUTOF10: 3
PAINLEVEL_OUTOF10: 5
PAINLEVEL_OUTOF10: 3
PAINLEVEL_OUTOF10: 0
PAINLEVEL_OUTOF10: 0
PAINLEVEL_OUTOF10: 6

## 2020-11-06 ASSESSMENT — PAIN DESCRIPTION - DIRECTION
RADIATING_TOWARDS: ACROSS BACK
RADIATING_TOWARDS: ACROSS BACK

## 2020-11-06 ASSESSMENT — PAIN DESCRIPTION - DESCRIPTORS
DESCRIPTORS: ACHING
DESCRIPTORS: ACHING

## 2020-11-06 ASSESSMENT — PAIN DESCRIPTION - ORIENTATION
ORIENTATION: LEFT
ORIENTATION: LEFT

## 2020-11-06 ASSESSMENT — PAIN DESCRIPTION - PROGRESSION
CLINICAL_PROGRESSION: NOT CHANGED
CLINICAL_PROGRESSION: NOT CHANGED

## 2020-11-06 ASSESSMENT — PAIN DESCRIPTION - LOCATION
LOCATION: FLANK
LOCATION: FLANK

## 2020-11-06 ASSESSMENT — PAIN - FUNCTIONAL ASSESSMENT
PAIN_FUNCTIONAL_ASSESSMENT: 0-10
PAIN_FUNCTIONAL_ASSESSMENT: ACTIVITIES ARE NOT PREVENTED
PAIN_FUNCTIONAL_ASSESSMENT: ACTIVITIES ARE NOT PREVENTED

## 2020-11-06 ASSESSMENT — PAIN DESCRIPTION - FREQUENCY
FREQUENCY: CONTINUOUS
FREQUENCY: CONTINUOUS

## 2020-11-06 ASSESSMENT — PAIN DESCRIPTION - PAIN TYPE
TYPE: ACUTE PAIN
TYPE: ACUTE PAIN

## 2020-11-06 NOTE — PROGRESS NOTES
PACU Transfer Note    Vitals:    11/06/20 1806   BP:    Pulse: 115   Resp: 19   Temp:    SpO2: 98%       In: 990 [I.V.:990]  Out: 400 [Urine:400]    Pain assessment:  VS stable. Pain level 3/10 tolerable Report given to Doni.  updated. Patient to room # 8623 as scheduled.    Pain Level: 3    Report given to Receiving unit RN.    11/6/2020 6:18 PM

## 2020-11-06 NOTE — H&P
Elva Nuñez    [address] Same Day Surgery Update H & P  Department of General Surgery   Surgical Service   Pre-operative History and Physical  Last H & P within the last 30 days. DIAGNOSIS:   Calculus of left kidney [N20.0]    Procedure(s):  LEFT PERCUTANEOUS NEPHROLITHOTOMY     HISTORY OF PRESENT ILLNESS:   Patient with left kidney stones presents today for the above procedure. Covid 19:  Patient denies fever, chills, cough or known exposure to Covid-19.        Past Medical History:        Diagnosis Date    Kidney stones      Past Surgical History:        Procedure Laterality Date    ADENOIDECTOMY       SECTION N/A 2020     SECTION performed [facility]que [facility] at [facility]    KIDNEY STONE SURGERY      KNEE SURGERY       Past Social History:  Social History     Socioeconomic History    Marital status:      Spouse name: None    Number of children: None    Years of education: None    Highest education level: None   Occupational History    None   Social Needs    Financial resource strain: None    Food insecurity     Worry: None     Inability: None    Transportation needs     Medical: None     Non-medical: None   Tobacco Use    Smoking status: Never Smoker    Smokeless tobacco: Never Used   Substance and Sexual Activity    Alcohol use: Not Currently    Drug use: Never    Sexual activity: Yes     Partners: Male   Lifestyle    Physical activity     Days per week: None     Minutes per session: None    Stress: None   Relationships    Social connections     Talks on phone: None     Gets together: None     Attends Tenriism service: None     Active member of club or organization: None     Attends meetings of clubs or organizations: None     Relationship status: None    Intimate partner violence     Fear of current or ex partner: None     Emotionally abused: None     Physically abused: None     Forced sexual activity: None   Other

## 2020-11-06 NOTE — BRIEF OP NOTE
Brief Postoperative Note      Patient: Elva Nuñez  YOB: 1990  MRN: [id number]    Date of Procedure: 11/6/2020    Pre-Op Diagnosis: Calculus of left kidney [N20.0]    Post-Op Diagnosis: Same       Procedure(s):  LEFT PERCUTANEOUS NEPHROLITHOTOMY    Surgeon(s):  Maury Clarke MD    Assistant:  * No surgical staff found *    Anesthesia: General    Estimated Blood Loss (mL): less than 088     Complications: None    Specimens:   ID Type Source Tests Collected by Time Destination   A : LEFT RENAL CALCULI FOR ANALYSIS Tissue Tissue SURGICAL PATHOLOGY Maury Clarke MD 11/6/2020 1643        Implants:  * No implants in log *      Drains:   Urethral Catheter Latex 16 fr (Active)       Findings: Large left renal stone burden    JOB: [id number]    Electronically signed by Maury Clarke MD on 11/6/2020 at 5:12 PM

## 2020-11-06 NOTE — ANESTHESIA PRE PROCEDURE
Component Value Date    WBC 16.1 05/07/2020    RBC 2.99 05/07/2020    HGB 7.9 05/07/2020    HCT 23.8 05/07/2020    MCV 79.9 05/07/2020    RDW 15.2 05/07/2020     05/07/2020       CMP:   Lab Results   Component Value Date     10/30/2020    K 4.1 10/30/2020     10/30/2020    CO2 26 10/30/2020    BUN 17 10/30/2020    CREATININE 0.7 10/30/2020    GFRAA >60 10/30/2020    AGRATIO 1.8 10/30/2020    LABGLOM >60 10/30/2020    GLUCOSE 119 10/30/2020    PROT 7.0 10/30/2020    CALCIUM 10.0 10/30/2020    BILITOT 0.4 10/30/2020    ALKPHOS 83 10/30/2020    AST 23 10/30/2020    ALT 43 10/30/2020       POC Tests: No results for input(s): POCGLU, POCNA, POCK, POCCL, POCBUN, POCHEMO, POCHCT in the last 72 hours. Coags: No results found for: PROTIME, INR, APTT    HCG (If Applicable): No results found for: PREGTESTUR, PREGSERUM, HCG, HCGQUANT     ABGs: No results found for: PHART, PO2ART, WEC0WIN, TJI4XIC, BEART, A7CUCRAA     Type & Screen (If Applicable):  No results found for: LABABO, LABRH    Drug/Infectious Status (If Applicable):  No results found for: HIV, HEPCAB    COVID-19 Screening (If Applicable):   Lab Results   Component Value Date    COVID19 Not Detected 11/02/2020         Anesthesia Evaluation    Airway: Mallampati: II  TM distance: >3 FB   Neck ROM: full  Mouth opening: > = 3 FB Dental:          Pulmonary:   (+) asthma: exercise-induced asthma,                            Cardiovascular:  Exercise tolerance: good (>4 METS),       (-) hypertension, past MI and  angina                Neuro/Psych:      (-) seizures           GI/Hepatic/Renal:   (+) GERD:, liver disease:,           Endo/Other:    (+) Diabetes (gestational only), . Abdominal:           Vascular:                                        Anesthesia Plan      general     ASA 2     (-npo MN  -denies chest pain or palp  -DM only gestational, now improved  -currently breastfeeding.   Discussed that it is not necessary to \"pump and dump\" but if she would like to this evening that is understandable)  Induction: intravenous. MIPS: Postoperative opioids intended. Anesthetic plan and risks discussed with patient. Plan discussed with CRNA.                   Haile Doe MD   11/6/2020

## 2020-11-06 NOTE — PROCEDURES
IR Brief Postoperative Note    Masha Bhat  YOB: 1990  [id number]    Pre-operative Diagnosis: L renal stones    Post-operative Diagnosis: Same    Procedure: left nephrolithotomy access    Anesthesia: gen    Surgeons/Assistants: joe    Estimated Blood Loss: Minimal    Complications: none    Specimens: were not obtained    See full procedure dictation to follow      Crow Pantoja MD  11/6/2020

## 2020-11-06 NOTE — PROGRESS NOTES
Patient admitted to PACU. Post op    Room / Location: [address] [place] [address] / [facility]    Anesthesia Start: 1247  Anesthesia Stop: 6721    Procedure: LEFT PERCUTANEOUS NEPHROLITHOTOMY (Left )  Diagnosis:        Calculus of left kidney       (Calculus of left kidney [N20.0])    Surgeon: Britney Mcginnis MD  Responsible Provider     Report received from anesthesia personnel- no problems noted during the case. Patient drowsy post op- no signs of discomfort noted. Left fland area with bandage- nephro tube intact- bloody drainage, no clots seen. Valentine catheter in place with red colored urine- no clots seen. Good peripheral pulses present. NO needs or concerns at this time.

## 2020-11-06 NOTE — H&P
Patient:  Pio Rhodes   :   1990      Relevant clinical history, particularly as it involves the pending procedure, was reviewed and discussed. The procedure including risks and benefits was discussed at length with the patient (or designated family member) and all questions were answered. Informed consent to proceed with the procedure was given. Vital signs were monitored and documented by the Radiology nurse. Targeted physical examination  Heart : regular rate and rhythm  Lungs : clear, breathing easily  Condition : stable    [facility] nurses notes reviewed and agreed.     Past Medical History:        Diagnosis Date    Kidney stones        Past Surgical History:           Procedure Laterality Date    ADENOIDECTOMY       SECTION N/A 2020     SECTION performed by Jyoti French MD at [facility] L[facility]D OR    KIDNEY STONE SURGERY      KNEE SURGERY         Allergies:  Sulfa antibiotics    Medications:   Home Meds  Current Facility-Administered Medications on File Prior to Encounter   Medication Dose Route Frequency Provider Last Rate Last Dose    sodium chloride flush 0.9 % injection 10 mL  10 mL Intravenous 2 times per day Carlos Conde, DO        sodium chloride flush 0.9 % injection 10 mL  10 mL Intravenous PRN Carlos Conde DO        0.9 % sodium chloride infusion   Intravenous Continuous Carlos Conde, DO        lactated ringers infusion   Intravenous Continuous Carlos Conde,  mL/hr at 20 1152      fentaNYL (SUBLIMAZE) injection 25 mcg  25 mcg Intravenous Q5 Min PRN Lindsey Peraza MD        fentaNYL (SUBLIMAZE) injection 50 mcg  50 mcg Intravenous Q5 Min PRN Lindsey Peraza MD        HYDROmorphone (DILAUDID) injection 0.25 mg  0.25 mg Intravenous Q5 Min PRN Lindsey Peraza MD        HYDROmorphone (DILAUDID) injection 0.5 mg  0.5 mg Intravenous Q5 Min PRN Lindsey Peraza MD        oxyCODONE-acetaminophen (PERCOCET) 5-325 MG per tablet 1 tablet  1 tablet Oral PRN Lane Yu MD        Or    oxyCODONE-acetaminophen (PERCOCET) 5-325 MG per tablet 2 tablet  2 tablet Oral PRN Lane Yu MD        diphenhydrAMINE (BENADRYL) injection 12.5 mg  12.5 mg Intravenous Once PRN Lane Yu, MD        prochlorperazine (COMPAZINE) injection 5 mg  5 mg Intravenous Once PRN Lane Yu, MD        ondansetron [facility]) injection 4 mg  4 mg Intravenous Once PRN Lane Yu, MD        labetalol (NORMODYNE;TRANDATE) injection syringe 5 mg  5 mg Intravenous Q10 Min PRN Lane Yu, MD        hydrALAZINE (APRESOLINE) injection 5 mg  5 mg Intravenous Q10 Min PRN Lane Yu, MD        meperidine (DEMEROL) injection 12.5 mg  12.5 mg Intravenous Q5 Min PRN Lane Yu, MD        lactated ringers infusion    Continuous PRN Eriberto Sandhuleod, APRN - CRNA 0 mL/hr at 11/06/20 1446      midazolam (VERSED) injection    PRN Eriberto Sandhulemelisa, APRN - CRNA   2 mg at 11/06/20 1248    fentaNYL (SUBLIMAZE) injection    PRN Eriberto Maclemelisa, APRN - CRNA   50 mcg at 11/06/20 1358    lidocaine PF 1 % injection    PRN Eriberto Macleod, APRN - CRNA   100 mg at 11/06/20 1252    propofol injection    PRN Eriberto Macleod, APRN - CRNA   50 mg at 11/06/20 1303    rocuronium (ZEMURON) injection    PRN Eriberto Macleod, APRN - CRNA   50 mg at 11/06/20 1252    Dexamethasone Sodium Phosphate injection    PRN Eriberto Sandhuleod, APRN - CRNA   8 mg at 11/06/20 1323    ondansetron (ZOFRAN) injection    PRN Eriberto Sandhulemelisa, APRN - CRNA   4 mg at 11/06/20 1324    sodium chloride 0.9 % irrigation    Continuous PRN Arthur White MD   24,000 mL at 11/06/20 1330    HYDROmorphone (DILAUDID) injection    PRN Billy Sneed APRN - CRNA   0.5 mg at 11/06/20 1516     Current Outpatient Medications on File Prior to Encounter   Medication Sig Dispense Refill    Prenat-Fe Carbonyl-FA-Omega 3 (ONE-A-DAY WOMENS PRENATAL 1 PO)          Current Meds  No current facility-administered medications for this encounter.    sodium chloride flush 0.9 % injection 10 mL, 2 times per day  sodium chloride flush 0.9 % injection 10 mL, PRN  0.9 % sodium chloride infusion, Continuous  lactated ringers infusion, Continuous  fentaNYL (SUBLIMAZE) injection 25 mcg, Q5 Min PRN  fentaNYL (SUBLIMAZE) injection 50 mcg, Q5 Min PRN  HYDROmorphone (DILAUDID) injection 0.25 mg, Q5 Min PRN  HYDROmorphone (DILAUDID) injection 0.5 mg, Q5 Min PRN  oxyCODONE-acetaminophen (PERCOCET) 5-325 MG per tablet 1 tablet, PRN    Or  oxyCODONE-acetaminophen (PERCOCET) 5-325 MG per tablet 2 tablet, PRN  diphenhydrAMINE (BENADRYL) injection 12.5 mg, Once PRN  prochlorperazine (COMPAZINE) injection 5 mg, Once PRN  ondansetron (ZOFRAN) injection 4 mg, Once PRN  labetalol (NORMODYNE;TRANDATE) injection syringe 5 mg, Q10 Min PRN  hydrALAZINE (APRESOLINE) injection 5 mg, Q10 Min PRN  meperidine (DEMEROL) injection 12.5 mg, Q5 Min PRN  lactated ringers infusion, Continuous PRN  midazolam (VERSED) injection, PRN  fentaNYL (SUBLIMAZE) injection, PRN  lidocaine PF 1 % injection, PRN  propofol injection, PRN  rocuronium (ZEMURON) injection, PRN  Dexamethasone Sodium Phosphate injection, PRN  ondansetron (ZOFRAN) injection, PRN  sodium chloride 0.9 % irrigation, Continuous PRN  HYDROmorphone (DILAUDID) injection, PRN          ASA 2 - Patient with mild systemic disease with no functional limitations    II (soft palate, uvula, fauces visible)    Activity:  2 - Able to move 4 extremities voluntarily on command  Respiration:  2 - Able to breathe deeply and cough freely  Circulation:  2 - BP+/- 20mmHg of normal  Consciousness:  2 - Fully awake  Oxygen Saturation (color):  2 - Able to maintain oxygen saturation >92% on room air    Sedation : gen anesthesia planned

## 2020-11-07 LAB
ANION GAP SERPL CALCULATED.3IONS-SCNC: 12 MMOL/L (ref 3–16)
BUN BLDV-MCNC: 12 MG/DL (ref 7–20)
CALCIUM SERPL-MCNC: 9.1 MG/DL (ref 8.3–10.6)
CHLORIDE BLD-SCNC: 104 MMOL/L (ref 99–110)
CO2: 25 MMOL/L (ref 21–32)
CREAT SERPL-MCNC: 0.6 MG/DL (ref 0.6–1.1)
GFR AFRICAN AMERICAN: >60
GFR NON-AFRICAN AMERICAN: >60
GLUCOSE BLD-MCNC: 128 MG/DL (ref 70–99)
HCT VFR BLD CALC: 36.8 % (ref 36–48)
HEMOGLOBIN: 12 G/DL (ref 12–16)
MCH RBC QN AUTO: 27.1 PG (ref 26–34)
MCHC RBC AUTO-ENTMCNC: 32.7 G/DL (ref 31–36)
MCV RBC AUTO: 82.9 FL (ref 80–100)
PDW BLD-RTO: 15.3 % (ref 12.4–15.4)
PLATELET # BLD: 284 K/UL (ref 135–450)
PMV BLD AUTO: 7.8 FL (ref 5–10.5)
POTASSIUM SERPL-SCNC: 4.4 MMOL/L (ref 3.5–5.1)
RBC # BLD: 4.43 M/UL (ref 4–5.2)
SODIUM BLD-SCNC: 141 MMOL/L (ref 136–145)
WBC # BLD: 12.9 K/UL (ref 4–11)

## 2020-11-07 PROCEDURE — 6370000000 HC RX 637 (ALT 250 FOR IP): Performed by: STUDENT IN AN ORGANIZED HEALTH CARE EDUCATION/TRAINING PROGRAM

## 2020-11-07 PROCEDURE — 96365 THER/PROPH/DIAG IV INF INIT: CPT

## 2020-11-07 PROCEDURE — 80048 BASIC METABOLIC PNL TOTAL CA: CPT

## 2020-11-07 PROCEDURE — G0378 HOSPITAL OBSERVATION PER HR: HCPCS

## 2020-11-07 PROCEDURE — 96366 THER/PROPH/DIAG IV INF ADDON: CPT

## 2020-11-07 PROCEDURE — 6360000002 HC RX W HCPCS: Performed by: STUDENT IN AN ORGANIZED HEALTH CARE EDUCATION/TRAINING PROGRAM

## 2020-11-07 PROCEDURE — 6370000000 HC RX 637 (ALT 250 FOR IP): Performed by: UROLOGY

## 2020-11-07 PROCEDURE — 2580000003 HC RX 258: Performed by: UROLOGY

## 2020-11-07 PROCEDURE — 2580000003 HC RX 258: Performed by: STUDENT IN AN ORGANIZED HEALTH CARE EDUCATION/TRAINING PROGRAM

## 2020-11-07 PROCEDURE — 85027 COMPLETE CBC AUTOMATED: CPT

## 2020-11-07 PROCEDURE — 36415 COLL VENOUS BLD VENIPUNCTURE: CPT

## 2020-11-07 RX ORDER — ACETAMINOPHEN 325 MG/1
650 TABLET ORAL EVERY 6 HOURS PRN
Status: DISCONTINUED | OUTPATIENT
Start: 2020-11-07 | End: 2020-11-08 | Stop reason: HOSPADM

## 2020-11-07 RX ORDER — OXYCODONE HYDROCHLORIDE 5 MG/1
5 TABLET ORAL EVERY 6 HOURS PRN
Qty: 6 TABLET | Refills: 0 | Status: SHIPPED | OUTPATIENT
Start: 2020-11-07 | End: 2020-11-10

## 2020-11-07 RX ORDER — CEPHALEXIN 500 MG/1
500 CAPSULE ORAL 4 TIMES DAILY
Qty: 12 CAPSULE | Refills: 0 | Status: SHIPPED | OUTPATIENT
Start: 2020-11-07 | End: 2020-11-08 | Stop reason: SDUPTHER

## 2020-11-07 RX ORDER — DEXTROSE AND SODIUM CHLORIDE 5; .45 G/100ML; G/100ML
INJECTION, SOLUTION INTRAVENOUS CONTINUOUS
Status: DISCONTINUED | OUTPATIENT
Start: 2020-11-07 | End: 2020-11-08 | Stop reason: HOSPADM

## 2020-11-07 RX ORDER — CEFAZOLIN SODIUM 2 G/50ML
2 SOLUTION INTRAVENOUS EVERY 8 HOURS
Status: DISCONTINUED | OUTPATIENT
Start: 2020-11-07 | End: 2020-11-08 | Stop reason: HOSPADM

## 2020-11-07 RX ADMIN — ACETAMINOPHEN 650 MG: 325 TABLET ORAL at 16:09

## 2020-11-07 RX ADMIN — ACETAMINOPHEN 650 MG: 325 TABLET ORAL at 22:12

## 2020-11-07 RX ADMIN — SODIUM CHLORIDE: 9 INJECTION, SOLUTION INTRAVENOUS at 11:49

## 2020-11-07 RX ADMIN — DEXTROSE AND SODIUM CHLORIDE: 5; 450 INJECTION, SOLUTION INTRAVENOUS at 16:09

## 2020-11-07 RX ADMIN — HYDROCODONE BITARTRATE AND ACETAMINOPHEN 1 TABLET: 5; 325 TABLET ORAL at 11:47

## 2020-11-07 RX ADMIN — HYDROCODONE BITARTRATE AND ACETAMINOPHEN 1 TABLET: 5; 325 TABLET ORAL at 23:07

## 2020-11-07 RX ADMIN — HYDROCODONE BITARTRATE AND ACETAMINOPHEN 1 TABLET: 5; 325 TABLET ORAL at 16:53

## 2020-11-07 RX ADMIN — CEFAZOLIN SODIUM 2 G: 2 SOLUTION INTRAVENOUS at 23:07

## 2020-11-07 RX ADMIN — CEFAZOLIN SODIUM 2 G: 2 SOLUTION INTRAVENOUS at 16:30

## 2020-11-07 RX ADMIN — SODIUM CHLORIDE: 9 INJECTION, SOLUTION INTRAVENOUS at 02:46

## 2020-11-07 ASSESSMENT — PAIN SCALES - GENERAL
PAINLEVEL_OUTOF10: 6
PAINLEVEL_OUTOF10: 3
PAINLEVEL_OUTOF10: 3
PAINLEVEL_OUTOF10: 0
PAINLEVEL_OUTOF10: 4
PAINLEVEL_OUTOF10: 0
PAINLEVEL_OUTOF10: 0
PAINLEVEL_OUTOF10: 3

## 2020-11-07 ASSESSMENT — PAIN DESCRIPTION - ORIENTATION: ORIENTATION: LEFT

## 2020-11-07 ASSESSMENT — PAIN DESCRIPTION - FREQUENCY: FREQUENCY: CONTINUOUS

## 2020-11-07 ASSESSMENT — PAIN DESCRIPTION - LOCATION: LOCATION: BACK

## 2020-11-07 ASSESSMENT — PAIN DESCRIPTION - PAIN TYPE: TYPE: SURGICAL PAIN

## 2020-11-07 ASSESSMENT — PAIN DESCRIPTION - DESCRIPTORS: DESCRIPTORS: THROBBING

## 2020-11-07 ASSESSMENT — PAIN - FUNCTIONAL ASSESSMENT: PAIN_FUNCTIONAL_ASSESSMENT: ACTIVITIES ARE NOT PREVENTED

## 2020-11-07 ASSESSMENT — PAIN DESCRIPTION - PROGRESSION: CLINICAL_PROGRESSION: GRADUALLY WORSENING

## 2020-11-07 ASSESSMENT — PAIN DESCRIPTION - ONSET: ONSET: ON-GOING

## 2020-11-07 NOTE — PROGRESS NOTES
Patient is alert and oriented. Patient has had some tachycardia but has been stable. All other VSS and afebrile. Patient has stated some pain and has been relieved by pain medication. Patient has been up and was able to walk in hallway. Dressing on left side has some serosanguinous. There has been no more drainage on the dressing throughout the night. Patient has carter in place with an hilda colored urine. Patient has nephrostomy on left side with red drainage. Fall precautions in place. Will continue to monitor.

## 2020-11-07 NOTE — PROGRESS NOTES
Urology Attending Progress Note      Subjective:   No events overnight  Pain controlled  Good UOP  Tolerating diet    Vitals:  /66   Pulse 106   Temp 98.2 °F (36.8 °C) (Oral)   Resp 16   Ht 5' 6\" (1.676 m)   Wt 234 lb (106.1 kg)   SpO2 97%   Breastfeeding Yes   BMI 37.77 kg/m²   Temp  Av.2 °F (36.8 °C)  Min: 96.9 °F (36.1 °C)  Max: 99 °F (37.2 °C)    Intake/Output Summary (Last 24 hours) at 2020  Last data filed at 2020 0757  Gross per 24 hour   Intake 2590 ml   Output 3650 ml   Net -1060 ml       Exam:   Abd: Soft, NTND  : Berry and L neph tube both w/ clear yellow urine w/ slight pink tinge. No clots    Labs:  WBC:    Lab Results   Component Value Date    WBC 12.9 2020     Hemoglobin/Hematocrit:    Lab Results   Component Value Date    HGB 12.0 2020    HCT 36.8 2020     BMP:    Lab Results   Component Value Date     2020    K 4.4 2020     2020    CO2 25 2020    BUN 12 2020    LABALBU 4.5 10/30/2020    CREATININE 0.6 2020    CALCIUM 9.1 2020    GFRAA >60 2020    LABGLOM >60 2020     PT/INR:    Lab Results   Component Value Date    PROTIME 12.2 2020    INR 1.05 2020     PTT:    Lab Results   Component Value Date    APTT 33.0 2020   [APTT      Impression/Plan:   27 y.o. female now 1 Day Post-Op s/p L PCNL    Doing well overall. Slightly tachycardic but improving and her Hgb is stable. Urine clear. Her Berry was removed on rounds and her neph tube clamped.     - Clamp trial of neph tube this AM- will remove this afternoon if no issues with fevers, pain, leakage.   - Plan for discharge later today      Paz Sims MD

## 2020-11-07 NOTE — PLAN OF CARE
Problem: Pain:  Goal: Control of acute pain  Description: Control of acute pain  Outcome: Ongoing  Note: Patient has stated some pain in her left flank from the tube and across her back. Patient has been given PRN pain medication with relief. Will continue to monitor. Problem: Skin Integrity:  Goal: Skin integrity will stabilize  Description: Skin integrity will stabilize  Outcome: Ongoing  Note: Patient's skin is clean and intact with exceptions to surgical incisions. Patient has no other signs of skin breakdown. Will continue to monitor.

## 2020-11-07 NOTE — PROGRESS NOTES
4 Eyes Admission Assessment     I agree as the admission nurse that 2 RN's have performed a thorough Head to Toe Skin Assessment on the patient. ALL assessment sites listed below have been assessed on admission. Areas assessed by both nurses:   [x]   Head, Face, and Ears   [x]   Shoulders, Back, and Chest  [x]   Arms, Elbows, and Hands   [x]   Coccyx, Sacrum, and Ischium  [x]   Legs, Feet, and Heels        Does the Patient have Skin Breakdown?   Yes a wound was noted on the Admission Assessment and an LDA was Initiated documentation include the Radha-wound, Wound Assessment, Measurements, Dressing Treatment, Drainage, and Color\",         Antonio Prevention initiated:  No   Wound Care Orders initiated:  No      WOC nurse consulted for Pressure Injury (Stage 3,4, Unstageable, DTI, NWPT, and Complex wounds) or Antonio score 18 or lower:  No      Nurse 1 eSignature: Electronically signed by Meghna Huerta on 11/7/20 at 12:48 AM EST        Nurse 2 eSignature: Electronically signed by Philip Barlow RN on 11/7/2020 at 1:07 AM

## 2020-11-07 NOTE — PROGRESS NOTES
100.6 temp, . Drainage coming from nephro tube. Reinforced with new cap, still leaking.  Notified MD.

## 2020-11-07 NOTE — DISCHARGE SUMMARY
Disregard discharge summary. Patient spiked fever to 101.1F after discharge orders were placed.   Patient will stay today and have Ancef started    Andrew Mclaughlin MD  Urology

## 2020-11-07 NOTE — PLAN OF CARE
Problem: Pain:  Goal: Pain level will decrease  Description: Pain level will decrease  Outcome: Ongoing  Note: No complaints of pain, SOB. Slight discomfort but manageable, per patient. Nephro tube clamped by MD, small drainage, abd pad placed by offgoing nurse. No drainage on dressing on assessment. Problem: Infection:  Goal: Will remain free from infection  Description: Will remain free from infection  Outcome: Ongoing  Note: Berry removed. Nephro tube clamped. VSS. . afrebrile     Problem: Skin Integrity:  Goal: Skin integrity will stabilize  Description: Skin integrity will stabilize  11/7/2020 1009 by Thompson Bhatti RN  Outcome: Ongoing  Note: Skin integrity intact upon assessment. Nephro tube in place, intact, small drainage at site. Reinforced by night RN with abd pad. No drainage on dressing.

## 2020-11-07 NOTE — PROGRESS NOTES
Patient was brought to 5304 via stretcher. Assisted pt to bed. Left flank dressing has serosanguinous dressing which has drainage (marked the area). Left nephrostomy and carter draining bloody secretions. Call light is within pt's reach. Assisted pt with ordering dinner.

## 2020-11-07 NOTE — PROGRESS NOTES
Patient was having some more drainage from dressing on left back. Bad pad was applied to help reinforce dressing. Will continue to monitor.

## 2020-11-07 NOTE — PROGRESS NOTES
Temp 101. 1. pt not reporting any pain. Was having chills earlier today. MD made aware, stated he was going to change D/C orders. Waiting for orders.  Will cont to monitor

## 2020-11-08 VITALS
HEART RATE: 110 BPM | OXYGEN SATURATION: 98 % | BODY MASS INDEX: 37.61 KG/M2 | HEIGHT: 66 IN | DIASTOLIC BLOOD PRESSURE: 75 MMHG | TEMPERATURE: 98.8 F | RESPIRATION RATE: 16 BRPM | WEIGHT: 234 LBS | SYSTOLIC BLOOD PRESSURE: 114 MMHG

## 2020-11-08 LAB
HCT VFR BLD CALC: 36.5 % (ref 36–48)
HEMOGLOBIN: 12.1 G/DL (ref 12–16)
MCH RBC QN AUTO: 27.1 PG (ref 26–34)
MCHC RBC AUTO-ENTMCNC: 33.2 G/DL (ref 31–36)
MCV RBC AUTO: 81.6 FL (ref 80–100)
PDW BLD-RTO: 15.1 % (ref 12.4–15.4)
PLATELET # BLD: 246 K/UL (ref 135–450)
PMV BLD AUTO: 7.7 FL (ref 5–10.5)
RBC # BLD: 4.47 M/UL (ref 4–5.2)
WBC # BLD: 9 K/UL (ref 4–11)

## 2020-11-08 PROCEDURE — 36415 COLL VENOUS BLD VENIPUNCTURE: CPT

## 2020-11-08 PROCEDURE — 85027 COMPLETE CBC AUTOMATED: CPT

## 2020-11-08 PROCEDURE — 2580000003 HC RX 258: Performed by: STUDENT IN AN ORGANIZED HEALTH CARE EDUCATION/TRAINING PROGRAM

## 2020-11-08 PROCEDURE — 6360000002 HC RX W HCPCS: Performed by: STUDENT IN AN ORGANIZED HEALTH CARE EDUCATION/TRAINING PROGRAM

## 2020-11-08 PROCEDURE — 6370000000 HC RX 637 (ALT 250 FOR IP): Performed by: STUDENT IN AN ORGANIZED HEALTH CARE EDUCATION/TRAINING PROGRAM

## 2020-11-08 PROCEDURE — G0378 HOSPITAL OBSERVATION PER HR: HCPCS

## 2020-11-08 PROCEDURE — 96366 THER/PROPH/DIAG IV INF ADDON: CPT

## 2020-11-08 RX ORDER — CEPHALEXIN 500 MG/1
500 CAPSULE ORAL 4 TIMES DAILY
Qty: 28 CAPSULE | Refills: 0 | Status: SHIPPED | OUTPATIENT
Start: 2020-11-08 | End: 2020-11-15

## 2020-11-08 RX ADMIN — CEFAZOLIN SODIUM 2 G: 2 SOLUTION INTRAVENOUS at 15:58

## 2020-11-08 RX ADMIN — DEXTROSE AND SODIUM CHLORIDE: 5; 450 INJECTION, SOLUTION INTRAVENOUS at 06:34

## 2020-11-08 RX ADMIN — CEFAZOLIN SODIUM 2 G: 2 SOLUTION INTRAVENOUS at 06:34

## 2020-11-08 RX ADMIN — ACETAMINOPHEN 650 MG: 325 TABLET ORAL at 11:36

## 2020-11-08 ASSESSMENT — PAIN DESCRIPTION - LOCATION
LOCATION: BACK
LOCATION: BACK

## 2020-11-08 ASSESSMENT — PAIN DESCRIPTION - ORIENTATION
ORIENTATION: LEFT
ORIENTATION: LEFT

## 2020-11-08 ASSESSMENT — PAIN SCALES - GENERAL
PAINLEVEL_OUTOF10: 3
PAINLEVEL_OUTOF10: 0
PAINLEVEL_OUTOF10: 0
PAINLEVEL_OUTOF10: 3

## 2020-11-08 ASSESSMENT — PAIN DESCRIPTION - DESCRIPTORS: DESCRIPTORS: THROBBING

## 2020-11-08 ASSESSMENT — PAIN - FUNCTIONAL ASSESSMENT: PAIN_FUNCTIONAL_ASSESSMENT: ACTIVITIES ARE NOT PREVENTED

## 2020-11-08 ASSESSMENT — PAIN DESCRIPTION - FREQUENCY: FREQUENCY: CONTINUOUS

## 2020-11-08 ASSESSMENT — PAIN DESCRIPTION - PAIN TYPE
TYPE: SURGICAL PAIN
TYPE: SURGICAL PAIN

## 2020-11-08 ASSESSMENT — PAIN DESCRIPTION - PROGRESSION: CLINICAL_PROGRESSION: GRADUALLY WORSENING

## 2020-11-08 ASSESSMENT — PAIN DESCRIPTION - ONSET: ONSET: ON-GOING

## 2020-11-08 NOTE — PLAN OF CARE
Problem: Pain:  Goal: Pain level will decrease  Description: Pain level will decrease  Outcome: Ongoing  Note: Pain at surgical site decreased after given Norco. Patient now resting in bed with eyes closed. Problem: Infection:  Goal: Will remain free from infection  Description: Will remain free from infection  Outcome: Ongoing  Note: Fever decreased to 99.5 after given Tylenol. Scheduled antibiotics being given as ordered. Patient remains tachy. Will continue to monitor. Problem: Skin Integrity:  Goal: Skin integrity will stabilize  Description: Skin integrity will stabilize  Outcome: Ongoing  Note: Drain dressing is dry and intact. Mild old drainage at site but is dry. Will continue to monitor.

## 2020-11-08 NOTE — PROGRESS NOTES
Pt is alert and oriented. VSS with exception to HR. Pt ambulating in the halls with a steady gait. Voiding adequately. Drain in place with moderate amount of output. Denies pain. Call light within reach.  Will continue to monitor

## 2020-11-08 NOTE — PROGRESS NOTES
Urology Attending Progress Note      Subjective:   Spiked a fever yesterday before planned discharge. Stayed for IV Abx and observation. Tmax 100.8F yesterday evening, afebrile since then  Feeling much better this AM.   Remains tachycardic, though improved from yesterday  Pain controlled  Good UOP  Tolerating diet    Vitals:  /83   Pulse 111   Temp 97.4 °F (36.3 °C) (Oral)   Resp 16   Ht 5' 6\" (1.676 m)   Wt 234 lb (106.1 kg)   SpO2 96%   Breastfeeding Yes   BMI 37.77 kg/m²   Temp  Av.6 °F (37.6 °C)  Min: 97.4 °F (36.3 °C)  Max: 100.8 °F (38.2 °C)    Intake/Output Summary (Last 24 hours) at 2020 0930  Last data filed at 2020 0835  Gross per 24 hour   Intake 1651.25 ml   Output 2425 ml   Net -773.75 ml       Exam:   Abd: Soft, NTND  : L neph tube w/ clear yellow urine. No clots    Labs:  WBC:    Lab Results   Component Value Date    WBC 12.9 2020     Hemoglobin/Hematocrit:    Lab Results   Component Value Date    HGB 12.0 2020    HCT 36.8 2020     BMP:    Lab Results   Component Value Date     2020    K 4.4 2020     2020    CO2 25 2020    BUN 12 2020    LABALBU 4.5 10/30/2020    CREATININE 0.6 2020    CALCIUM 9.1 2020    GFRAA >60 2020    LABGLOM >60 2020     PT/INR:    Lab Results   Component Value Date    PROTIME 12.2 2020    INR 1.05 2020     PTT:    Lab Results   Component Value Date    APTT 33.0 2020   [APTT      Impression/Plan:   27 y.o. female now 2 Days Post-Op s/p L PCNL    Doing well overall, though she failed her clamp trial and spiked some low grade fevers yesterday.  She remains slightly tachycardic, though clinically she looks very well today    - Repeat CBC this AM pending  - Ancef while in house, will plan for discharge on Keflex x7d   - Pending lab results and patient's course throughout the day, will plan for discharge later today vs tomorrow      Yanni Arvizu,

## 2020-11-08 NOTE — PROGRESS NOTES
Patient had 100.8 fever at beginning of shift. Tylenol given and patient temperature now 99.5. Patient remains tachy. Last . Patient receiving 2g Ancef Q8h IV. Fluids infusing in right AC. Patient tolerating diet. Patient left drain site dry and intact with mild drainage around dressing. Patient voiding adequately and having moderate output through nephro tube. Patient c/o pain at surgical site. Norco given with relief. Patient SB assist and has ambulated in room. All needs being met at this time. Will continue to monitor.

## 2020-11-08 NOTE — DISCHARGE SUMMARY
Urology Discharge Summary      Patient Identification  Ricky Modi is a 27 y.o. female. :  1990  Admit Date:  2020    Discharge date:   No discharge date for patient encounter. Disposition: home    Discharge Diagnoses:   Patient Active Problem List   Diagnosis     delivery delivered    Fatty infiltration of liver    Kidney stone       Surgery: Procedure(s) (LRB):  LEFT PERCUTANEOUS NEPHROLITHOTOMY (Left)     Activity:  no heavy lifting for 4 weeks    Condition on discharge: good    Follow-up: Dr. Francisca Ariza will contact Pt with timing of neph tube removal, possible antegrade nephrostogram    Hospital course: Patient failed clamp trial POD1, developed fevers. Pt to be sent home on Abx with neph tube in place.  Otherwise uncomplicated, discharged POD2    Viola Soria MD

## 2020-11-09 ENCOUNTER — CARE COORDINATION (OUTPATIENT)
Dept: OTHER | Facility: CLINIC | Age: 30
End: 2020-11-09

## 2020-11-10 ENCOUNTER — CARE COORDINATION (OUTPATIENT)
Dept: OTHER | Facility: CLINIC | Age: 30
End: 2020-11-10

## 2020-11-10 ENCOUNTER — HOSPITAL ENCOUNTER (OUTPATIENT)
Dept: GENERAL RADIOLOGY | Age: 30
Discharge: HOME | End: 2020-11-10
Payer: COMMERCIAL

## 2020-11-10 PROCEDURE — 74018 RADEX ABDOMEN 1 VIEW: CPT

## 2020-11-10 NOTE — OP NOTE
[address], [address]                                OPERATIVE REPORT    PATIENT NAME: Chuyita Martinez                  :        1990  MED REC NO:   [id number]                          ROOM:  ACCOUNT NO:   [de-identified]                           ADMIT DATE: 2020  PROVIDER:     Kike Allen MD    DATE OF PROCEDURE:  2020    PREOPERATIVE DIAGNOSIS:  Large left renal stone. POSTOPERATIVE DIAGNOSIS:  Large left renal stone. OPERATION PERFORMED:    1) Left percutaneous nephrolithotomy  2) Left nephrostomy tube placement     SURGEON:  Kike Allen MD    ANESTHESIA:  General.    ANTIBIOTICS:  Ancef 2 gm IV once. COMPLICATIONS:  None. ESTIMATED BLOOD LOSS:  75 mL. SPECIMEN:  Left renal stone. INDICATION FOR PROCEDURE:  The patient is a very pleasant 26-year-old  female who came under my care for a large left renal stone over 2 cm in  size. She presents today for percutaneous nephrolithotomy for  treatment. She understands the risk of surgery including bleeding,  infection, injury to intestine or lung, and need for more than one  surgery to clear her stone. DESCRIPTION OF PROCEDURE:  After informed consent, the patient was taken  to the operating room, placed under general anesthesia. She was then  repositioned prone and all pressure points were padded. The patient was  appropriately secured to the operating table. Her back was then  sterilely prepped and draped in the usual sterile fashion. Interventional radiology began the procedure by obtaining percutaneous  access. This was somewhat difficult and prolonged, but eventually they  were able to get lower pole access. For details of this procedure,  please see Dr. Nilam Cowart dictation. He obtained access including placement  of an access sheath. When I entered the room, I  sterilely scrubbed and then removed the dilation balloon. pass the flexible uteroscope beyond the UPJ and into the  ureter, but was unable to do so because the ureter was too narrow. I  injected contrast into the collecting system and the contrast flowed  down to the distal ureter, but was hard to follow beyond the distal ureter  and to the bladder. There was some contrast within the bladder. I  did not appreciate signs of any ureteral stones with this antegrade  study. All scopes were then removed. Additional contrast was injected  into collecting system just for identification purposes. A 12-Rw[place]an  nephrostomy tube was loaded over the working wire and into the  collecting system. The coil was locked in place. The access sheath was  then cut away from the nephrostomy tube. The nephrostomy tube was  sutured, secured to the patient's skin using a silk suture. The skin  defect around the nephrostomy tube was closed with a buried Vicryl  suture. The patient was then repositioned into supine positioning and  extubated. It should be noted that she did have an indwelling Berry  catheter for the entirety of the case. The patient was then extubated  and taken to Recovery in stable condition. POSTPROCEDURAL PLAN:  The patient will remain admitted overnight for  monitoring purposes. We will attempt nephrostomy tube clamping trial in  the morning.         Pura Treviño MD    D: 11/09/2020 16:40:07       T: 11/10/2020 2:53:44     [id number]  Job#: [id number]     Doc#: [id number]    CC:

## 2020-11-10 NOTE — ANESTHESIA POSTPROCEDURE EVALUATION
Department of Anesthesiology  Postprocedure Note    Patient: Jeanne Boyer  MRN: [id number]  [place]: 1990  Date of evaluation: 11/10/2020  Time:  9:14 AM     Procedure Summary     Date:  11/06/20 Room / Location:  [address] [place] [address]    Anesthesia Start:  1247 Anesthesia Stop:  4906    Procedure:  LEFT PERCUTANEOUS NEPHROLITHOTOMY (Left ) Diagnosis:       Calculus of left kidney      (Calculus of left kidney [N20.0])    Surgeon:  Laila Mei MD Responsible Provider:  Maria Victoria Arrington MD    Anesthesia Type:  general ASA Status:  2          Anesthesia Type: general    Nick Phase I: Nick Score: 9    Nick Phase II:      Last vitals: Reviewed and per EMR flowsheets.        Anesthesia Post Evaluation    Patient location during evaluation: PACU  Patient participation: complete - patient participated  Level of consciousness: awake  Pain score: 2  Airway patency: patent  Nausea & Vomiting: no nausea and no vomiting  Complications: no  Cardiovascular status: hemodynamically stable  Respiratory status: acceptable  Hydration status: euvolemic

## 2020-11-10 NOTE — CARE COORDINATION
[facility] Transitions Follow Up Call    11/10/2020    Patient: Dolores Mccollum  Patient : 1990   MRN: [id number]  Reason for Admission: Left percutaneous nephrolithotomy  Discharge Date: 20 RARS: Readmission Risk Score: 4         Spoke with: N/A    ACM attempted 2nd outreach to reach patient for introduction to Associate Care Management. HIPAA compliant message left requesting a return phone call at patients convenience. Unable to reach letter sent to patient via [address] Will continue to outreach patient. Demario Nuñez RN BSN  Associate Care Manager  Phone: [phone]  Email: [email or web address]

## 2020-11-11 ENCOUNTER — HOSPITAL ENCOUNTER (OUTPATIENT)
Dept: INTERVENTIONAL RADIOLOGY/VASCULAR | Age: 30
Discharge: HOME | End: 2020-11-11
Payer: COMMERCIAL

## 2020-11-11 VITALS — BODY MASS INDEX: 36.96 KG/M2 | WEIGHT: 230 LBS | TEMPERATURE: 99 F | HEIGHT: 66 IN

## 2020-11-11 PROCEDURE — 50431 NJX PX NFROSGRM &/URTRGRM: CPT

## 2020-11-11 RX ORDER — ACETAMINOPHEN 325 MG/1
650 TABLET ORAL EVERY 6 HOURS PRN
COMMUNITY

## 2020-11-16 ENCOUNTER — CARE COORDINATION (OUTPATIENT)
Dept: OTHER | Facility: CLINIC | Age: 30
End: 2020-11-16

## 2020-11-16 NOTE — CARE COORDINATION
[facility] Transitions Follow Up Call    2020    Patient: Chris Martinez  Patient : 1990   MRN: [id number]  Reason for Admission: Left percutaneous nephrolithotomy  Discharge Date: 20 RARS: Readmission Risk Score: 4         Spoke with: N/A    ACM attempted third and final call to patient for introduction to Associate Care Management. HIPAA compliant message left requesting a return phone call at patients convenience. ACM sent Final unable to contact letter via [address] ACM will sign off, if no return call. Diana Ramirez RN BSN  Associate Care Manager  Phone: [phone]  Email: [email or web address]

## 2020-11-20 LAB
CALCULI COMPOSITION: NORMAL
MASS: NORMAL MG
STONE DESCRIPTION: NORMAL
STONE NUMBER: NORMAL
STONE SIZE: NORMAL MM

## 2020-12-04 ENCOUNTER — HOSPITAL ENCOUNTER (OUTPATIENT)
Dept: ULTRASOUND IMAGING | Age: 30
Discharge: HOME | End: 2020-12-04
Payer: COMMERCIAL

## 2020-12-04 PROCEDURE — 76770 US EXAM ABDO BACK WALL COMP: CPT

## 2020-12-23 ENCOUNTER — OFFICE VISIT (OUTPATIENT)
Dept: FAMILY MEDICINE CLINIC | Age: 30
End: 2020-12-23
Payer: COMMERCIAL

## 2020-12-23 ENCOUNTER — HOSPITAL ENCOUNTER (OUTPATIENT)
Dept: CT IMAGING | Age: 30
Discharge: HOME | End: 2020-12-23
Payer: COMMERCIAL

## 2020-12-23 VITALS
OXYGEN SATURATION: 99 % | TEMPERATURE: 97.5 F | BODY MASS INDEX: 38.31 KG/M2 | HEART RATE: 103 BPM | DIASTOLIC BLOOD PRESSURE: 74 MMHG | SYSTOLIC BLOOD PRESSURE: 110 MMHG | HEIGHT: 66 IN | WEIGHT: 238.4 LBS

## 2020-12-23 DIAGNOSIS — Z00.00 ANNUAL PHYSICAL EXAM: Primary | ICD-10-CM

## 2020-12-23 PROCEDURE — 74178 CT ABD&PLV WO CNTR FLWD CNTR: CPT

## 2020-12-23 PROCEDURE — 99385 PREV VISIT NEW AGE 18-39: CPT | Performed by: NURSE PRACTITIONER

## 2020-12-23 PROCEDURE — 6360000004 HC RX CONTRAST MEDICATION: Performed by: UROLOGY

## 2020-12-23 RX ADMIN — IOPAMIDOL 80 ML: 755 INJECTION, SOLUTION INTRAVENOUS at 07:28

## 2020-12-23 NOTE — PROGRESS NOTES
History and Physical      Jos Guo  YOB: 1990    Date of Service:  12/23/2020    Chief Complaint:   Jos Guo is a 27 y.o. female who presents for complete physical examination. HPI: Presents to clinic today for annual physical exam.    Breastfeeding. Has a daughter that is 10 months old. Diet: Very Good  Exercise: no regular exercise  Occupation: Ortho tech  Family life: , 1 child, 1 dog. Lives in house. Medium to low stress. No concerns for anxiety/depression. Sexual activity: has sex with males   Hx abnormal PAP: no  Self breast exams: yes   Last eye exam: Unsure,normal   Last dental exam: 2020    Preventive Care:  Health Maintenance   Topic Date Due    Hepatitis C screen  1990    Varicella vaccine (1 of 2 - 2-dose childhood series) 09/07/1991    HIV screen  09/07/2005    Flu vaccine (1) 09/01/2020    Cervical cancer screen  09/20/2021    DTaP/Tdap/Td vaccine (2 - Td) 03/10/2030    Hepatitis A vaccine  Aged Out    Hepatitis B vaccine  Aged Out    Hib vaccine  Aged Out    Meningococcal (ACWY) vaccine  Aged Out    Pneumococcal 0-64 years Vaccine  Aged Out        Family History:  Colon Cancer: None  Thyroid Cancer/Disease: None  Melanoma: None  Breast/Uterine/Ovarian/Endometrial Cancer: None  Prostate Cancer: Father dx in his early 46s       Preventive plan of care for Jos Guo        12/23/2020           Preventive Measures Status       Recommendations for screening   Colon Cancer Screen   Last colonoscopy: N/A This test is not clinically indicated   Breast Cancer Screen  Last mammogram: N/A  This test is not clinically indicated   Cervical Cancer Screen   Last PAP smear: 2019 Repeat every 3 years   Osteoporosis Screen   Last DXA scan:  N?A This test is not clinically indicated   Diabetes Screen  Glucose (mg/dL)   Date Value   11/07/2020 128 (H)    Repeat yearly   Cholesterol Screen  Lab Results   Component Value Date CHOL 175 2020    TRIG 149 2020    HDL 46 2020    LDLCALC 99 2020    Repeat yearly   Aspirin for Cardiovascular Prevention   No Not indicated   Weight: Body mass index is 38.77 kg/m².   5' 5.75\" (1.67 m)238 lb 6.4 oz (108.1 kg)    Your BMI is 25 or greater, which indicates that you are overweight   Living Will: No       Recommended Immunizations    Immunization History   Administered Date(s) Administered    Tdap (Boostrix, Adacel) 03/10/2020        Influenza vaccine:  recommended every fall                Wt Readings from Last 3 Encounters:   20 238 lb 6.4 oz (108.1 kg)   20 230 lb (104.3 kg)   20 234 lb (106.1 kg)     BP Readings from Last 3 Encounters:   20 110/74   20 114/75   20 125/71       Patient Active Problem List   Diagnosis     delivery delivered    Fatty infiltration of liver    Kidney stone       Allergies   Allergen Reactions    Sulfa Antibiotics      Outpatient Medications Marked as Taking for the 20 encounter (Office Visit) with AWAIS Rangel CNP   Medication Sig Dispense Refill    acetaminophen (TYLENOL) 325 MG tablet Take 650 mg by mouth every 6 hours as needed for Pain      Prenat-Fe Carbonyl-FA-Omega 3 (ONE-A-DAY WOMENS PRENATAL 1 PO)          Past Medical History:   Diagnosis Date    Kidney stones      Past Surgical History:   Procedure Laterality Date    ADENOIDECTOMY       SECTION N/A 2020     SECTION performed by Annette [facility]ige [facility] at [facility]    IR NEPHROSTOMY PERCUTANEOUS LEFT  2020    IR NEPHROSTOMY PERCUTANEOUS LEFT 2020 [facility] SPECIAL PROCEDURES    KIDNEY STONE SURGERY      KNEE SURGERY      NEPHROLITHOTOMY Left 2020    LEFT PERCUTANEOUS NEPHROLITHOTOMY [facility]     Family History   Problem Relation Age of Onset    High Blood Pressure Mother     High Cholesterol Mother      Social History Socioeconomic History    Marital status:      Spouse name: Not on file    Number of children: Not on file    Years of education: Not on file    Highest education level: Not on file   Occupational History    Not on file   Social Needs    Financial resource strain: Not on file    Food insecurity     Worry: Not on file     Inability: Not on file    Transportation needs     Medical: Not on file     Non-medical: Not on file   Tobacco Use    Smoking status: Never Smoker    Smokeless tobacco: Never Used   Substance and Sexual Activity    Alcohol use: Not Currently    Drug use: Never    Sexual activity: Yes     Partners: Male   Lifestyle    Physical activity     Days per week: Not on file     Minutes per session: Not on file    Stress: Not on file   Relationships    Social connections     Talks on phone: Not on file     Gets together: Not on file     Attends Latter-day service: Not on file     Active member of club or organization: Not on file     Attends meetings of clubs or organizations: Not on file     Relationship status: Not on file    Intimate partner violence     Fear of current or ex partner: Not on file     Emotionally abused: Not on file     Physically abused: Not on file     Forced sexual activity: Not on file   Other Topics Concern    Not on file   Social History Narrative    Not on file       Review of Systems:  A comprehensive review of systems was negative except for what was noted in the HPI. Physical Exam:   Vitals:    12/23/20 1635   BP: 110/74   Site: Left Upper Arm   Position: Sitting   Cuff Size: Large Adult   Pulse: 103   Temp: 97.5 °F (36.4 °C)   SpO2: 99%   Weight: 238 lb 6.4 oz (108.1 kg)   Height: 5' 5.75\" (1.67 m)     Body mass index is 38.77 kg/m². Physical Exam  Constitutional:       General: She is not in acute distress. Appearance: Normal appearance. She is well-developed. HENT:      Head: Normocephalic and atraumatic.

## 2021-01-05 NOTE — PATIENT INSTRUCTIONS
Care instructions adapted under license by [facility] ([facility]). If you have questions about a medical condition or this instruction, always ask your healthcare professional. [address] any warranty or liability for your use of this information.

## 2021-07-14 ENCOUNTER — TELEPHONE (OUTPATIENT)
Dept: FAMILY MEDICINE CLINIC | Age: 31
End: 2021-07-14

## 2021-07-14 NOTE — TELEPHONE ENCOUNTER
Attempted to make follow up phone call to patient - unavailable - left voicemail with office call back number. Calling to schedule virtual visit for her consultation for wellness visit.

## 2021-08-06 ENCOUNTER — VIRTUAL VISIT (OUTPATIENT)
Dept: FAMILY MEDICINE CLINIC | Age: 31
End: 2021-08-06
Payer: COMMERCIAL

## 2021-08-06 DIAGNOSIS — R73.9 HYPERGLYCEMIA: Primary | ICD-10-CM

## 2021-08-06 DIAGNOSIS — E66.09 CLASS 2 OBESITY DUE TO EXCESS CALORIES WITHOUT SERIOUS COMORBIDITY WITH BODY MASS INDEX (BMI) OF 39.0 TO 39.9 IN ADULT: ICD-10-CM

## 2021-08-06 DIAGNOSIS — Z86.32 PERSONAL HISTORY OF GESTATIONAL DIABETES: ICD-10-CM

## 2021-08-06 PROCEDURE — 99213 OFFICE O/P EST LOW 20 MIN: CPT | Performed by: NURSE PRACTITIONER

## 2021-08-06 ASSESSMENT — ENCOUNTER SYMPTOMS
NAUSEA: 0
DIARRHEA: 0
SHORTNESS OF BREATH: 0
VOMITING: 0
COUGH: 0

## 2021-08-06 NOTE — PATIENT INSTRUCTIONS
Patient Education        Learning About Low-Carbohydrate Diets  What is a low-carbohydrate diet? A low-carbohydrate (or \"low-carb\") diet limits foods and drinks that have carbohydrates. This includes grains, fruits, milk and yogurt, and starchy vegetables like potatoes, beans, and corn. It also avoids foods and drinks that have added sugar. Instead, low-carb diets include foods that are high in protein and fat. Why might you follow a low-carb diet? Low-carb diets may be used for a variety of reasons, such as for weight loss. People who have diabetes may use a low-carb diet to help manage their blood sugar levels. What should you do before you start the diet? Talk to your doctor before you try any diet. This is even more important if you have health problems like kidney disease, heart disease, or diabetes. Your doctor may suggest that you meet with a registered dietitian. A dietitian can help you make an eating plan that works for you. What foods do you eat on a low-carb diet? On a low-carb diet, you choose foods that are high in protein and fat. Examples of these are:  · Meat, poultry, and fish. · Eggs. · Nuts, such as walnuts, pecans, almonds, and peanuts. · Peanut butter and other nut butters. · Tofu. · Avocado. · [place]. · Non-starchy vegetables like broccoli, cauliflower, green beans, mushrooms, peppers, lettuce, and spinach. · Unsweetened non-dairy milks like almond milk and coconut milk. · Cheese, cottage cheese, and cream cheese. Current as of: December 17, 2020               Content Version: 12.9  © 2006-2021 Healthwise, [employer]. Care instructions adapted under license by [facility] ([facility]). If you have questions about a medical condition or this instruction, always ask your healthcare professional. [address]  any warranty or liability for your use of this information.

## 2021-08-06 NOTE — PROGRESS NOTES
Mary Anne Apple  : 1990  Encounter date: 2021    This is a 27 y.o. female who is being seen Virtually using [employer] me. Patient is at home and AWAIS Thao is at the office. The patient has consented to having a virtual visit due to the [place] 19 pandemic. Chief Complaint   Patient presents with    Other     Lab review/health counseling     History of present illness:    HPI   1. Presents virtually for follow up/counseling on health markers. Recently completed a biometric screening with [employer]. Screening was completed on 21. Lipid panel was good, blood pressure normal.  BMI and waist circumference elevated. Fasting glucose elevated. Did have gestational diabetes with last pregnancy - was managed by diet. Patient has been working on controlling carb intake with diet and incorporating some exercise. Does stay active with work and at home with her 17 month old daughter Ed Hall. Allergies   Allergen Reactions    Sulfa Antibiotics      Current Outpatient Medications   Medication Sig Dispense Refill    acetaminophen (TYLENOL) 325 MG tablet Take 650 mg by mouth every 6 hours as needed for Pain      Prenat-Fe Carbonyl-FA-Omega 3 (ONE-A-DAY WOMENS PRENATAL 1 PO)        No current facility-administered medications for this visit. Review of Systems   Constitutional: Negative for activity change, appetite change, chills, fatigue and fever. Respiratory: Negative for cough and shortness of breath. Cardiovascular: Negative for chest pain and palpitations. Gastrointestinal: Negative for diarrhea, nausea and vomiting. Past medical, surgical, family and social history were reviewed and updated with the patient. Objective: There were no vitals taken for this visit.         BP Readings from Last 3 Encounters:   20 110/74   20 114/75   20 125/71     Wt Readings from Last 3 Encounters:   20 238 lb 6.4 oz (108.1 kg)   20 230 lb (104.3 kg) 11/06/20 234 lb (106.1 kg)     - PHYSICAL EXAM LIMITED DUE TO VIRTUAL VISIT, HOWEVER VISUAL INSPECTION DID REVEAL:  Physical Exam  Constitutional:       General: She is not in acute distress. Appearance: Normal appearance. Neurological:      Mental Status: She is alert and oriented to person, place, and time. Psychiatric:         Mood and Affect: Mood normal.         Speech: Speech normal.         Behavior: Behavior normal.       Assessment/Plan    1. Hyperglycemia  Discussed healthy diet and incorporating regular exercise. Has recently had repeat blood work with GYN - she will be monitored closely. Otherwise can repeat blood work in 3-6 months for monitoring. 2. Personal history of gestational diabetes    3. Class 2 obesity due to excess calories without serious comorbidity with body mass index (BMI) of 39.0 to 39.9 in adult  Encouraged healthy diet and regular exercise. Ricky Modi was counseled regarding symptoms of current diagnosis, course and complications of disease if inadequately treated. Discussed side effects of medications, diagnosis, treatment options, and prognosis along with risks, benefits, complications, and alternatives of treatment including labs, imaging and other studies/treatment targets and goals. She verbalized understanding of instructions and counseling. Return in about 4 months (around 12/6/2021) for annual exam.     Medical decision making of low complexity.

## 2021-11-11 ENCOUNTER — TELEPHONE (OUTPATIENT)
Dept: FAMILY MEDICINE CLINIC | Age: 31
End: 2021-11-11

## 2021-11-11 DIAGNOSIS — J03.90 EXUDATIVE TONSILLITIS: Primary | ICD-10-CM

## 2021-11-11 DIAGNOSIS — N91.2 AMENORRHEA: Primary | ICD-10-CM

## 2021-11-11 LAB — GONADOTROPIN, CHORIONIC (HCG) QUANT: <5 MIU/ML

## 2022-06-21 LAB
ABO, EXTERNAL RESULT: NORMAL
HEP B, EXTERNAL RESULT: NEGATIVE
HIV, EXTERNAL RESULT: NONREACTIVE
RH FACTOR, EXTERNAL RESULT: POSITIVE
RPR, EXTERNAL RESULT: NONREACTIVE
RUBELLA TITER, EXTERNAL RESULT: NORMAL

## 2022-07-07 ENCOUNTER — TELEPHONE (OUTPATIENT)
Dept: FAMILY MEDICINE CLINIC | Age: 32
End: 2022-07-07

## 2022-07-07 NOTE — TELEPHONE ENCOUNTER
Patient made contact with office. Recently tested positive for Covid and is currently 15 weeks pregnant. Started with sinus congestion and low grade fevers two days prior. Per patient last evening started feeling worse. Tested positive for Covid last evening. Per patient today is fatigued along with some mild URI. Denies any SOB. Some tightness in chest, but doesn't feel like she can't breathe. Received Covid vaccine +booster - thinks her booster was in Feb.  Advised her with her symptoms at this point remaining mild and she is otherwise generally healthy and young I would recommend she monitors symptoms for now and treat symptomatically.   Call office if symptoms worsen or persist.

## 2022-08-11 LAB
C. TRACHOMATIS, EXTERNAL RESULT: NEGATIVE
N. GONORRHOEAE, EXTERNAL RESULT: NEGATIVE

## 2022-12-27 ENCOUNTER — HOSPITAL ENCOUNTER (OUTPATIENT)
Age: 32
Discharge: HOME | End: 2022-12-27
Payer: COMMERCIAL

## 2022-12-27 LAB
A/G RATIO: 1.4 (ref 1.1–2.2)
ABO/RH: NORMAL
ALBUMIN SERPL-MCNC: 3.2 G/DL (ref 3.4–5)
ALP BLD-CCNC: 92 U/L (ref 40–129)
ALT SERPL-CCNC: 15 U/L (ref 10–40)
ANION GAP SERPL CALCULATED.3IONS-SCNC: 12 MMOL/L (ref 3–16)
ANTIBODY SCREEN: NORMAL
AST SERPL-CCNC: 16 U/L (ref 15–37)
BASOPHILS ABSOLUTE: 0.1 K/UL (ref 0–0.2)
BASOPHILS RELATIVE PERCENT: 0.6 %
BILIRUB SERPL-MCNC: <0.2 MG/DL (ref 0–1)
BUN BLDV-MCNC: 9 MG/DL (ref 7–20)
CALCIUM SERPL-MCNC: 8.7 MG/DL (ref 8.3–10.6)
CHLORIDE BLD-SCNC: 104 MMOL/L (ref 99–110)
CO2: 22 MMOL/L (ref 21–32)
CREAT SERPL-MCNC: 0.6 MG/DL (ref 0.6–1.1)
EOSINOPHILS ABSOLUTE: 0.2 K/UL (ref 0–0.6)
EOSINOPHILS RELATIVE PERCENT: 1.7 %
GFR SERPL CREATININE-BSD FRML MDRD: >60 ML/MIN/{1.73_M2}
GLUCOSE BLD-MCNC: 96 MG/DL (ref 70–99)
HCT VFR BLD CALC: 33 % (ref 36–48)
HEMOGLOBIN: 10.8 G/DL (ref 12–16)
LYMPHOCYTES ABSOLUTE: 2.7 K/UL (ref 1–5.1)
LYMPHOCYTES RELATIVE PERCENT: 24.9 %
MCH RBC QN AUTO: 24.9 PG (ref 26–34)
MCHC RBC AUTO-ENTMCNC: 32.8 G/DL (ref 31–36)
MCV RBC AUTO: 75.7 FL (ref 80–100)
MONOCYTES ABSOLUTE: 0.7 K/UL (ref 0–1.3)
MONOCYTES RELATIVE PERCENT: 6.3 %
NEUTROPHILS ABSOLUTE: 7.1 K/UL (ref 1.7–7.7)
NEUTROPHILS RELATIVE PERCENT: 66.5 %
PDW BLD-RTO: 15.7 % (ref 12.4–15.4)
PLATELET # BLD: 287 K/UL (ref 135–450)
PMV BLD AUTO: 8.6 FL (ref 5–10.5)
POTASSIUM SERPL-SCNC: 4.2 MMOL/L (ref 3.5–5.1)
RBC # BLD: 4.36 M/UL (ref 4–5.2)
SODIUM BLD-SCNC: 138 MMOL/L (ref 136–145)
TOTAL PROTEIN: 5.5 G/DL (ref 6.4–8.2)
WBC # BLD: 10.7 K/UL (ref 4–11)

## 2022-12-27 PROCEDURE — 36415 COLL VENOUS BLD VENIPUNCTURE: CPT

## 2022-12-27 PROCEDURE — 86850 RBC ANTIBODY SCREEN: CPT

## 2022-12-27 PROCEDURE — 80053 COMPREHEN METABOLIC PANEL: CPT

## 2022-12-27 PROCEDURE — 86900 BLOOD TYPING SEROLOGIC ABO: CPT

## 2022-12-27 PROCEDURE — 85025 COMPLETE CBC W/AUTO DIFF WBC: CPT

## 2022-12-27 PROCEDURE — 86780 TREPONEMA PALLIDUM: CPT

## 2022-12-27 PROCEDURE — 86901 BLOOD TYPING SEROLOGIC RH(D): CPT

## 2022-12-28 ENCOUNTER — HOSPITAL ENCOUNTER (INPATIENT)
Age: 32
LOS: 2 days | Discharge: HOME | End: 2022-12-30
Attending: OBSTETRICS & GYNECOLOGY | Admitting: OBSTETRICS & GYNECOLOGY
Payer: COMMERCIAL

## 2022-12-28 ENCOUNTER — ANESTHESIA EVENT (OUTPATIENT)
Dept: LABOR AND DELIVERY | Age: 32
End: 2022-12-28
Payer: COMMERCIAL

## 2022-12-28 ENCOUNTER — ANESTHESIA (OUTPATIENT)
Dept: LABOR AND DELIVERY | Age: 32
End: 2022-12-28
Payer: COMMERCIAL

## 2022-12-28 PROBLEM — Z3A.39 39 WEEKS GESTATION OF PREGNANCY: Status: ACTIVE | Noted: 2022-12-28

## 2022-12-28 LAB
AMPHETAMINE SCREEN, URINE: NORMAL
BARBITURATE SCREEN URINE: NORMAL
BENZODIAZEPINE SCREEN, URINE: NORMAL
BUPRENORPHINE URINE: NORMAL
CANNABINOID SCREEN URINE: NORMAL
COCAINE METABOLITE SCREEN URINE: NORMAL
FENTANYL SCREEN, URINE: NORMAL
Lab: NORMAL
METHADONE SCREEN, URINE: NORMAL
OPIATE SCREEN URINE: NORMAL
OXYCODONE URINE: NORMAL
PH UA: 6.5
PHENCYCLIDINE SCREEN URINE: NORMAL
TOTAL SYPHILLIS IGG/IGM: NORMAL
TOTAL SYPHILLIS IGG/IGM: NORMAL

## 2022-12-28 PROCEDURE — 80307 DRUG TEST PRSMV CHEM ANLYZR: CPT

## 2022-12-28 PROCEDURE — 7100000001 HC PACU RECOVERY - ADDTL 15 MIN: Performed by: OBSTETRICS & GYNECOLOGY

## 2022-12-28 PROCEDURE — 6360000002 HC RX W HCPCS: Performed by: NURSE ANESTHETIST, CERTIFIED REGISTERED

## 2022-12-28 PROCEDURE — 3609079900 HC CESAREAN SECTION: Performed by: OBSTETRICS & GYNECOLOGY

## 2022-12-28 PROCEDURE — 2500000003 HC RX 250 WO HCPCS: Performed by: NURSE ANESTHETIST, CERTIFIED REGISTERED

## 2022-12-28 PROCEDURE — 6360000002 HC RX W HCPCS: Performed by: OBSTETRICS & GYNECOLOGY

## 2022-12-28 PROCEDURE — 1220000000 HC SEMI PRIVATE OB R&B

## 2022-12-28 PROCEDURE — 2580000003 HC RX 258: Performed by: OBSTETRICS & GYNECOLOGY

## 2022-12-28 PROCEDURE — 6370000000 HC RX 637 (ALT 250 FOR IP): Performed by: OBSTETRICS & GYNECOLOGY

## 2022-12-28 PROCEDURE — 2580000003 HC RX 258: Performed by: NURSE ANESTHETIST, CERTIFIED REGISTERED

## 2022-12-28 PROCEDURE — 2709999900 HC NON-CHARGEABLE SUPPLY: Performed by: OBSTETRICS & GYNECOLOGY

## 2022-12-28 PROCEDURE — 3700000000 HC ANESTHESIA ATTENDED CARE: Performed by: OBSTETRICS & GYNECOLOGY

## 2022-12-28 PROCEDURE — 3700000001 HC ADD 15 MINUTES (ANESTHESIA): Performed by: OBSTETRICS & GYNECOLOGY

## 2022-12-28 PROCEDURE — 7100000000 HC PACU RECOVERY - FIRST 15 MIN: Performed by: OBSTETRICS & GYNECOLOGY

## 2022-12-28 PROCEDURE — 86780 TREPONEMA PALLIDUM: CPT

## 2022-12-28 RX ORDER — SODIUM CHLORIDE, SODIUM LACTATE, POTASSIUM CHLORIDE, CALCIUM CHLORIDE 600; 310; 30; 20 MG/100ML; MG/100ML; MG/100ML; MG/100ML
INJECTION, SOLUTION INTRAVENOUS CONTINUOUS
Status: DISCONTINUED | OUTPATIENT
Start: 2022-12-28 | End: 2022-12-30 | Stop reason: HOSPADM

## 2022-12-28 RX ORDER — SODIUM CHLORIDE 9 MG/ML
INJECTION, SOLUTION INTRAVENOUS PRN
Status: DISCONTINUED | OUTPATIENT
Start: 2022-12-28 | End: 2022-12-30 | Stop reason: HOSPADM

## 2022-12-28 RX ORDER — ONDANSETRON 2 MG/ML
INJECTION INTRAMUSCULAR; INTRAVENOUS
Status: DISPENSED
Start: 2022-12-28 | End: 2022-12-28

## 2022-12-28 RX ORDER — DOCUSATE SODIUM 100 MG/1
100 CAPSULE, LIQUID FILLED ORAL 2 TIMES DAILY
Status: DISCONTINUED | OUTPATIENT
Start: 2022-12-28 | End: 2022-12-30 | Stop reason: HOSPADM

## 2022-12-28 RX ORDER — OXYTOCIN 10 [USP'U]/ML
INJECTION, SOLUTION INTRAMUSCULAR; INTRAVENOUS PRN
Status: DISCONTINUED | OUTPATIENT
Start: 2022-12-28 | End: 2022-12-28 | Stop reason: SDUPTHER

## 2022-12-28 RX ORDER — FAMOTIDINE 10 MG/ML
INJECTION, SOLUTION INTRAVENOUS PRN
Status: DISCONTINUED | OUTPATIENT
Start: 2022-12-28 | End: 2022-12-28 | Stop reason: SDUPTHER

## 2022-12-28 RX ORDER — FERROUS SULFATE 325(65) MG
325 TABLET ORAL 2 TIMES DAILY WITH MEALS
Status: DISCONTINUED | OUTPATIENT
Start: 2022-12-28 | End: 2022-12-30 | Stop reason: HOSPADM

## 2022-12-28 RX ORDER — ASPIRIN 81 MG/1
81 TABLET ORAL DAILY
COMMUNITY

## 2022-12-28 RX ORDER — DIPHENHYDRAMINE HYDROCHLORIDE 50 MG/ML
12.5 INJECTION INTRAMUSCULAR; INTRAVENOUS EVERY 6 HOURS PRN
Status: DISCONTINUED | OUTPATIENT
Start: 2022-12-28 | End: 2022-12-30 | Stop reason: HOSPADM

## 2022-12-28 RX ORDER — METFORMIN HYDROCHLORIDE 500 MG/1
500 TABLET, EXTENDED RELEASE ORAL DAILY
COMMUNITY
Start: 2022-12-13

## 2022-12-28 RX ORDER — LANOLIN 100 %
OINTMENT (GRAM) TOPICAL
Status: DISCONTINUED | OUTPATIENT
Start: 2022-12-28 | End: 2022-12-30 | Stop reason: HOSPADM

## 2022-12-28 RX ORDER — TRISODIUM CITRATE DIHYDRATE AND CITRIC ACID MONOHYDRATE 500; 334 MG/5ML; MG/5ML
30 SOLUTION ORAL ONCE
Status: DISCONTINUED | OUTPATIENT
Start: 2022-12-28 | End: 2022-12-30 | Stop reason: HOSPADM

## 2022-12-28 RX ORDER — FAMOTIDINE 10 MG/ML
INJECTION, SOLUTION INTRAVENOUS
Status: COMPLETED
Start: 2022-12-28 | End: 2022-12-28

## 2022-12-28 RX ORDER — SODIUM CHLORIDE 0.9 % (FLUSH) 0.9 %
10 SYRINGE (ML) INJECTION EVERY 12 HOURS SCHEDULED
Status: DISCONTINUED | OUTPATIENT
Start: 2022-12-28 | End: 2022-12-30 | Stop reason: HOSPADM

## 2022-12-28 RX ORDER — MORPHINE SULFATE 0.5 MG/ML
INJECTION, SOLUTION EPIDURAL; INTRATHECAL; INTRAVENOUS PRN
Status: DISCONTINUED | OUTPATIENT
Start: 2022-12-28 | End: 2022-12-28 | Stop reason: SDUPTHER

## 2022-12-28 RX ORDER — SODIUM CHLORIDE, SODIUM LACTATE, POTASSIUM CHLORIDE, CALCIUM CHLORIDE 600; 310; 30; 20 MG/100ML; MG/100ML; MG/100ML; MG/100ML
INJECTION, SOLUTION INTRAVENOUS CONTINUOUS PRN
Status: DISCONTINUED | OUTPATIENT
Start: 2022-12-28 | End: 2022-12-28 | Stop reason: SDUPTHER

## 2022-12-28 RX ORDER — METHYLERGONOVINE MALEATE 0.2 MG/ML
200 INJECTION INTRAVENOUS PRN
Status: DISCONTINUED | OUTPATIENT
Start: 2022-12-28 | End: 2022-12-30 | Stop reason: HOSPADM

## 2022-12-28 RX ORDER — IBUPROFEN 600 MG/1
600 TABLET ORAL EVERY 8 HOURS SCHEDULED
Status: DISCONTINUED | OUTPATIENT
Start: 2022-12-29 | End: 2022-12-29

## 2022-12-28 RX ORDER — OXYCODONE HYDROCHLORIDE 5 MG/1
5 TABLET ORAL EVERY 4 HOURS PRN
Status: DISCONTINUED | OUTPATIENT
Start: 2022-12-28 | End: 2022-12-30 | Stop reason: HOSPADM

## 2022-12-28 RX ORDER — ACETAMINOPHEN 500 MG
1000 TABLET ORAL EVERY 8 HOURS SCHEDULED
Status: DISCONTINUED | OUTPATIENT
Start: 2022-12-28 | End: 2022-12-30 | Stop reason: HOSPADM

## 2022-12-28 RX ORDER — BUPIVACAINE HYDROCHLORIDE 7.5 MG/ML
INJECTION, SOLUTION INTRASPINAL PRN
Status: DISCONTINUED | OUTPATIENT
Start: 2022-12-28 | End: 2022-12-28 | Stop reason: SDUPTHER

## 2022-12-28 RX ORDER — OXYCODONE HYDROCHLORIDE 5 MG/1
10 TABLET ORAL EVERY 4 HOURS PRN
Status: DISCONTINUED | OUTPATIENT
Start: 2022-12-28 | End: 2022-12-30 | Stop reason: HOSPADM

## 2022-12-28 RX ORDER — SODIUM CHLORIDE 0.9 % (FLUSH) 0.9 %
10 SYRINGE (ML) INJECTION PRN
Status: DISCONTINUED | OUTPATIENT
Start: 2022-12-28 | End: 2022-12-30 | Stop reason: HOSPADM

## 2022-12-28 RX ORDER — SODIUM CHLORIDE 0.9 % (FLUSH) 0.9 %
5-40 SYRINGE (ML) INJECTION EVERY 12 HOURS SCHEDULED
Status: DISCONTINUED | OUTPATIENT
Start: 2022-12-28 | End: 2022-12-30 | Stop reason: HOSPADM

## 2022-12-28 RX ORDER — ONDANSETRON 2 MG/ML
4 INJECTION INTRAMUSCULAR; INTRAVENOUS EVERY 6 HOURS PRN
Status: DISCONTINUED | OUTPATIENT
Start: 2022-12-28 | End: 2022-12-30 | Stop reason: HOSPADM

## 2022-12-28 RX ORDER — ONDANSETRON 2 MG/ML
INJECTION INTRAMUSCULAR; INTRAVENOUS PRN
Status: DISCONTINUED | OUTPATIENT
Start: 2022-12-28 | End: 2022-12-28 | Stop reason: SDUPTHER

## 2022-12-28 RX ORDER — DIPHENHYDRAMINE HYDROCHLORIDE 50 MG/ML
INJECTION INTRAMUSCULAR; INTRAVENOUS
Status: DISPENSED
Start: 2022-12-28 | End: 2022-12-28

## 2022-12-28 RX ORDER — SODIUM CHLORIDE 0.9 % (FLUSH) 0.9 %
5-40 SYRINGE (ML) INJECTION PRN
Status: DISCONTINUED | OUTPATIENT
Start: 2022-12-28 | End: 2022-12-30 | Stop reason: HOSPADM

## 2022-12-28 RX ORDER — SODIUM CHLORIDE, SODIUM LACTATE, POTASSIUM CHLORIDE, AND CALCIUM CHLORIDE .6; .31; .03; .02 G/100ML; G/100ML; G/100ML; G/100ML
1000 INJECTION, SOLUTION INTRAVENOUS ONCE
Status: DISCONTINUED | OUTPATIENT
Start: 2022-12-28 | End: 2022-12-30 | Stop reason: HOSPADM

## 2022-12-28 RX ORDER — KETOROLAC TROMETHAMINE 30 MG/ML
30 INJECTION, SOLUTION INTRAMUSCULAR; INTRAVENOUS EVERY 6 HOURS
Status: DISPENSED | OUTPATIENT
Start: 2022-12-28 | End: 2022-12-29

## 2022-12-28 RX ADMIN — ONDANSETRON 4 MG: 2 INJECTION INTRAMUSCULAR; INTRAVENOUS at 11:10

## 2022-12-28 RX ADMIN — CEFAZOLIN 2 G: 10 INJECTION, POWDER, FOR SOLUTION INTRAVENOUS at 08:23

## 2022-12-28 RX ADMIN — BUPIVACAINE HYDROCHLORIDE 1.8 ML: 7.5 INJECTION, SOLUTION SUBARACHNOID at 08:33

## 2022-12-28 RX ADMIN — KETOROLAC TROMETHAMINE 30 MG: 30 INJECTION, SOLUTION INTRAMUSCULAR at 17:20

## 2022-12-28 RX ADMIN — OXYTOCIN 20 UNITS: 10 INJECTION INTRAVENOUS at 09:01

## 2022-12-28 RX ADMIN — OXYTOCIN 10 UNITS: 10 INJECTION INTRAVENOUS at 09:32

## 2022-12-28 RX ADMIN — ONDANSETRON 4 MG: 2 INJECTION INTRAMUSCULAR; INTRAVENOUS at 17:16

## 2022-12-28 RX ADMIN — DOCUSATE SODIUM 100 MG: 100 CAPSULE, LIQUID FILLED ORAL at 20:38

## 2022-12-28 RX ADMIN — SODIUM CHLORIDE, SODIUM LACTATE, POTASSIUM CHLORIDE, AND CALCIUM CHLORIDE: .6; .31; .03; .02 INJECTION, SOLUTION INTRAVENOUS at 09:32

## 2022-12-28 RX ADMIN — FAMOTIDINE 20 MG: 10 INJECTION, SOLUTION INTRAVENOUS at 08:04

## 2022-12-28 RX ADMIN — SODIUM CHLORIDE, SODIUM LACTATE, POTASSIUM CHLORIDE, AND CALCIUM CHLORIDE: .6; .31; .03; .02 INJECTION, SOLUTION INTRAVENOUS at 09:01

## 2022-12-28 RX ADMIN — SODIUM CHLORIDE, SODIUM LACTATE, POTASSIUM CHLORIDE, AND CALCIUM CHLORIDE: .6; .31; .03; .02 INJECTION, SOLUTION INTRAVENOUS at 08:00

## 2022-12-28 RX ADMIN — ONDANSETRON 4 MG: 2 INJECTION INTRAMUSCULAR; INTRAVENOUS at 08:04

## 2022-12-28 RX ADMIN — ACETAMINOPHEN 1000 MG: 500 TABLET ORAL at 20:38

## 2022-12-28 RX ADMIN — MORPHINE SULFATE 0.15 MG: 0.5 INJECTION EPIDURAL; INTRATHECAL; INTRAVENOUS at 08:33

## 2022-12-28 RX ADMIN — DIPHENHYDRAMINE HYDROCHLORIDE 12.5 MG: 50 INJECTION, SOLUTION INTRAMUSCULAR; INTRAVENOUS at 11:11

## 2022-12-28 ASSESSMENT — LIFESTYLE VARIABLES: SMOKING_STATUS: 0

## 2022-12-28 ASSESSMENT — PAIN - FUNCTIONAL ASSESSMENT: PAIN_FUNCTIONAL_ASSESSMENT: ACTIVITIES ARE NOT PREVENTED

## 2022-12-28 ASSESSMENT — PAIN SCALES - GENERAL
PAINLEVEL_OUTOF10: 2
PAINLEVEL_OUTOF10: 3

## 2022-12-28 ASSESSMENT — PAIN DESCRIPTION - LOCATION: LOCATION: INCISION

## 2022-12-28 ASSESSMENT — PAIN DESCRIPTION - DESCRIPTORS: DESCRIPTORS: DISCOMFORT;DULL;SORE

## 2022-12-28 NOTE — ANESTHESIA PROCEDURE NOTES
Spinal Block    Patient location during procedure: OB  End time: 12/28/2022 8:34 AM  Reason for block: primary anesthetic  Staffing  Performed: resident/CRNA   Resident/CRNA: AWAIS Burkett - CRNA  Spinal Block  Patient position: sitting  Prep: ChloraPrep  Patient monitoring: continuous pulse ox and frequent blood pressure checks  Approach: midline  Location: L3/L4  Provider prep: mask and sterile gloves  Local infiltration: lidocaine  Needle  Needle type: Pencan   Needle gauge: 25 G  Needle length: 4 in  Assessment  Sensory level: T4  Swirl obtained: Yes  CSF: clear  Attempts: 1  Hemodynamics: stable  Preanesthetic Checklist  Completed: patient identified, IV checked, site marked, risks and benefits discussed, surgical/procedural consents, equipment checked, pre-op evaluation, timeout performed, anesthesia consent given, oxygen available, monitors applied/VS acknowledged, fire risk safety assessment completed and verbalized and blood product R/B/A discussed and consented

## 2022-12-28 NOTE — BRIEF OP NOTE
Brief Postoperative Note      Patient: Blade Ochoa  YOB: 1990  MRN: [id number]    Date of Procedure: 2022    Pre-Op Diagnosis: Prior C/S-declines TOLAC, Gest DM-A2 (Metformin)  [phone]    Post-Op Diagnosis: Same       Procedure(s):   SECTION-Repeat LTC/S    Surgeon(s):  Bindu Pride MD    Assistant:  Surgical Assistant: Talya Boone    Anesthesia: Spinal    Estimated Blood Loss (mL): 993 cc-SBL    Complications: None        Drains:   Nephrostomy Tube 1 Left 12 fr (Active)       Urinary Catheter 22 Berry (Active)    cc clear    Findings: VFI, 7# 13 oz.  Apgars 8/9    Electronically signed by Chris Vela MD on 2022 at 9:51 AM Paige didn't call back until today.  Per Dr. Richardson, will have her hold today and tomorrows dose, then return to usual dosing and recheck on Monday.  Paige verbalized understanding.  I left a message for Janell ASHTON.

## 2022-12-28 NOTE — PLAN OF CARE
Problem: Vaginal Birth or  Section  Goal: Fetal and maternal status remain reassuring during the birth process  Description:  Birth OB-Pregnancy care plan goal which identifies if the fetal and maternal status remain reassuring during the birth process  2022 1855 by Curt Montague RN  Outcome: Progressing  2022 6908 by Rashawn Connolly RN  Outcome: Progressing     Problem: Postpartum  Goal: Experiences normal postpartum course  Description:  Postpartum OB-Pregnancy care plan goal which identifies if the mother is experiencing a normal postpartum course  2022 1855 by Curt Montague RN  Outcome: Progressing  2022 0632 by Rashawn Connolly RN  Outcome: Progressing  Goal: Appropriate maternal -  bonding  Description:  Postpartum OB-Pregnancy care plan goal which identifies if the mother and  are bonding appropriately  2022 185 by Curt Montague RN  Outcome: Progressing  2022 2515 by Rashawn Connolly RN  Outcome: Progressing  Goal: Establishment of infant feeding pattern  Description:  Postpartum OB-Pregnancy care plan goal which identifies if the mother is establishing a feeding pattern with their   2022 185 by Curt Montague RN  Outcome: Progressing  2022 4145 by Rashawn Connolly RN  Outcome: Progressing  Goal: Incisions, wounds, or drain sites healing without S/S of infection  2022 185 by Curt Montague RN  Outcome: Progressing  2022 0632 by Rashawn Connolly RN  Outcome: Progressing     Problem: Pain  Goal: Verbalizes/displays adequate comfort level or baseline comfort level  2022 185 by Curt Montague RN  Outcome: Progressing  2022 0632 by Rashawn Connolly RN  Outcome: Progressing     Problem: Infection - Adult  Goal: Absence of infection at discharge  2022 185 by Curt Montague RN  Outcome: Progressing  2022 9538 by Dewayne Bustamante Jag Zimmerman RN  Outcome: Progressing  Goal: Absence of infection during hospitalization  12/28/2022 1855 by Anatoliy Ann RN  Outcome: Progressing  12/28/2022 2358 by Ella Eng RN  Outcome: Progressing     Problem: Safety - Adult  Goal: Free from fall injury  12/28/2022 1855 by Anatoliy Ann RN  Outcome: Progressing  12/28/2022 6776 by Ella Eng RN  Outcome: Progressing     Problem: Discharge Planning  Goal: Discharge to home or other facility with appropriate resources  12/28/2022 1855 by Anatoliy Ann RN  Outcome: Progressing  12/28/2022 0632 by Ella Eng RN  Outcome: Progressing

## 2022-12-28 NOTE — ANESTHESIA PRE PROCEDURE
Department of Anesthesiology  Preprocedure Note       Name:  Kinjal Teresa   Age:  28 y.o.  :  1990                                          MRN:  [id number]         Date:  2022      Surgeon: Kacy Faulkner):  Kala Veloz MD    Procedure: Procedure(s):   SECTION    Medications prior to admission:   Prior to Admission medications    Medication Sig Start Date End Date Taking? Authorizing Provider   aspirin 81 MG EC tablet Take 81 mg by mouth daily   Yes Historical Provider, MD   metFORMIN (GLUCOPHAGE) 500 MG tablet Take 500 mg by mouth 2 times daily (with meals)   Yes Historical Provider, MD   metFORMIN (GLUCOPHAGE-XR) 500 MG extended release tablet Take 500 mg by mouth daily 22   Historical Provider, MD   acetaminophen (TYLENOL) 325 MG tablet Take 650 mg by mouth every 6 hours as needed for Pain  Patient not taking: Reported on 2022    Historical Provider, MD Dav[place]at-Fe Carbonyl-FA-Omega 3 (ONE-A-DAY WOMENS PRENATAL 1 PO)  10/1/18   Historical Provider, MD       Current medications:    Current Facility-Administered Medications   Medication Dose Route Frequency Provider Last Rate Last Admin    famotidine (PEPCID) 20 MG/2ML injection             CEFAZOLIN 2000 MG D5W 100 ML IVPB IVPB                Allergies:     Allergies   Allergen Reactions    Sulfa Antibiotics Hives     2022 -  2022 -        Problem List:    Patient Active Problem List   Diagnosis Code     delivery delivered O82    Fatty infiltration of liver K76.0    Kidney stone N20.0       Past Medical History:        Diagnosis Date    Kidney stones        Past Surgical History:        Procedure Laterality Date    ADENOIDECTOMY       SECTION N/A 2020     SECTION [facility]    IR NEPHROSTOMY PERCUTANEOUS LEFT  2020    IR NEPHROSTOMY PERCUTANEOUS LEFT 2020 [facility]    KIDNEY STONE SURGERY      KNEE SURGERY      NEPHROLITHOTOMY Left 11/06/2020    LEFT PERCUTANEOUS NEPHROLITHOTOMY performed by Kareem Campos MD at [address] EXTRACTION         Social History:    Social History     Tobacco Use    Smoking status: Never    Smokeless tobacco: Never   Substance Use Topics    Alcohol use: Not Currently                                Counseling given: Not Answered      Vital Signs (Current):   Vitals:    12/28/22 0641   BP: 132/87   Pulse: 98   Resp: 16   Temp: 36.7 °C (98.1 °F)   TempSrc: Oral   SpO2: 99%                                              BP Readings from Last 3 Encounters:   12/28/22 132/87   12/23/20 110/74   11/08/20 114/75       NPO Status: Time of last liquid consumption: 2300                        Time of last solid consumption: 2000                        Date of last liquid consumption: 12/27/22                        Date of last solid food consumption: 12/27/22    BMI:   Wt Readings from Last 3 Encounters:   12/23/20 238 lb 6.4 oz (108.1 kg)   11/11/20 230 lb (104.3 kg)   11/06/20 234 lb (106.1 kg)     There is no height or weight on file to calculate BMI.    CBC:   Lab Results   Component Value Date/Time    WBC 10.7 12/27/2022 09:43 AM    RBC 4.36 12/27/2022 09:43 AM    HGB 10.8 12/27/2022 09:43 AM    HCT 33.0 12/27/2022 09:43 AM    MCV 75.7 12/27/2022 09:43 AM    RDW 15.7 12/27/2022 09:43 AM     12/27/2022 09:43 AM       CMP:   Lab Results   Component Value Date/Time     12/27/2022 09:43 AM    K 4.2 12/27/2022 09:43 AM     12/27/2022 09:43 AM    CO2 22 12/27/2022 09:43 AM    BUN 9 12/27/2022 09:43 AM    CREATININE 0.6 12/27/2022 09:43 AM    GFRAA >60 11/07/2020 05:29 AM    AGRATIO 1.4 12/27/2022 09:43 AM    LABGLOM >60 12/27/2022 09:43 AM    GLUCOSE 96 12/27/2022 09:43 AM    PROT 5.5 12/27/2022 09:43 AM    CALCIUM 8.7 12/27/2022 09:43 AM    BILITOT <0.2 12/27/2022 09:43 AM    ALKPHOS 92 12/27/2022 09:43 AM    AST 16 12/27/2022 09:43 AM    ALT 15 12/27/2022 09:43 AM       POC Tests: No results for input(s): POCGLU, POCNA, POCK, POCCL, POCBUN, POCHEMO, POCHCT in the last 72 hours. Coags:   Lab Results   Component Value Date/Time    PROTIME 12.2 11/06/2020 01:04 PM    INR 1.05 11/06/2020 01:04 PM    APTT 33.0 11/06/2020 01:04 PM       HCG (If Applicable):   Lab Results   Component Value Date    PREGTESTUR Negative 11/06/2020        ABGs: No results found for: PHART, PO2ART, JIB0XHS, FZE6FHZ, BEART, C6MKBXTL     Type & Screen (If Applicable):  No results found for: LABABO, LABRH    Drug/Infectious Status (If Applicable):  No results found for: HIV, HEPCAB    COVID-19 Screening (If Applicable):   Lab Results   Component Value Date/Time    COVID19 Not Detected 11/02/2020 08:53 AM           Anesthesia Evaluation  Patient summary reviewed and Nursing notes reviewed no history of anesthetic complications:   Airway: Mallampati: II  TM distance: >3 FB   Neck ROM: full  Mouth opening: > = 3 FB   Dental:          Pulmonary:   (+) recent URI (sinus infection 2 weeks ago treated with antibiotics, reports lingering dry cough):      (-) not a current smoker                           Cardiovascular:Negative CV ROS  Exercise tolerance: good (>4 METS),            Beta Blocker:  Not on Beta Blocker         Neuro/Psych:   Negative Neuro/Psych ROS              GI/Hepatic/Renal:   (+) GERD: no interval change, renal disease: kidney stones and no interval change,           Endo/Other:    (+) Diabetes (taking glucophage)well controlled, , .    (-) blood dyscrasia               Abdominal:             Vascular: negative vascular ROS. Other Findings:           Anesthesia Plan      spinal     ASA 2           MIPS: Postoperative opioids intended and Prophylactic antiemetics administered. Anesthetic plan and risks discussed with patient. Use of blood products discussed with patient whom consented to blood products.            Post-op pain plan if not by surgeon: intrathecal narcotics            Naomie Call, APRN - CRNA   12/28/2022

## 2022-12-28 NOTE — H&P
Department of Obstetrics and Gynecology   Obstetrics History and Physical        CHIEF COMPLAINT:  repeat c/s    HISTORY OF PRESENT ILLNESS:      The patient is a 28 y.o. female at 37w11d.   OB History          2    Para   1    Term   1            AB        Living   1         SAB        IAB        Ectopic        Molar        Multiple   0    Live Births   1            Patient presents with a chief complaint as above and is being admitted for   without tubal ligation    Estimated Due Date: Estimated Date of Delivery: 22    PRENATAL CARE:    Complicated by: none    PAST OB HISTORY:  OB History          2    Para   1    Term   1            AB        Living   1         SAB        IAB        Ectopic        Molar        Multiple   0    Live Births   1                Past Medical History:        Diagnosis Date    Kidney stones      Past Surgical History:        Procedure Laterality Date    ADENOIDECTOMY       SECTION N/A 2020     SECTION performed by Lalit Olivares MD at [facility] L[facility]D [facility]    IR NEPHROSTOMY PERCUTANEOUS LEFT  2020    IR NEPHROSTOMY PERCUTANEOUS LEFT 2020 [facility]    KIDNEY STONE SURGERY      KNEE SURGERY      NEPHROLITHOTOMY Left 2020    LEFT PERCUTANEOUS NEPHROLITHOTOMY performed by Lissa Page MD at [address] EXTRACTION       Allergies:  Sulfa antibiotics    Social History:    Social History     Socioeconomic History    Marital status:      Spouse name: Not on file    Number of children: Not on file    Years of education: Not on file    Highest education level: Not on file   Occupational History    Not on file   Tobacco Use    Smoking status: Never    Smokeless tobacco: Never   Vaping Use    Vaping Use: Never used   Substance and Sexual Activity    Alcohol use: Not Currently    Drug use: Never    Sexual activity: Yes     Partners: Male   Other Topics Concern    Not on file   Social History Narrative    Not on file     Social Determinants of Health     Financial Resource Strain: Not on file   Food Insecurity: Not on file   Transportation Needs: Not on file   Physical Activity: Not on file   Stress: Not on file   Social Connections: Not on file   Intimate Partner Violence: Not on file   Housing Stability: Not on file     Family History:       Problem Relation Age of Onset    High Blood Pressure Mother     High Cholesterol Mother      Medications Prior to Admission:  Medications Prior to Admission: aspirin 81 MG EC tablet, Take 81 mg by mouth daily  metFORMIN (GLUCOPHAGE) 500 MG tablet, Take 500 mg by mouth 2 times daily (with meals)  metFORMIN (GLUCOPHAGE-XR) 500 MG extended release tablet, Take 500 mg by mouth daily  acetaminophen (TYLENOL) 325 MG tablet, Take 650 mg by mouth every 6 hours as needed for Pain (Patient not taking: Reported on 12/28/2022)  Prenat-Fe Carbonyl-FA-Omega 3 (ONE-A-DAY WOMENS PRENATAL 1 PO),     REVIEW OF SYSTEMS:    wnl    PHYSICAL EXAM:  Vitals:    12/28/22 0641   BP: 132/87   Pulse: 98   Resp: 16   Temp: 98.1 °F (36.7 °C)   TempSrc: Oral   SpO2: 99%     General appearance:  awake, alert, cooperative, no apparent distress, and appears stated age  Neurologic:  Awake, alert, oriented to name, place and time. Lungs:  No increased work of breathing, good air exchange  Abdomen:  Soft, non tender, gravid, consistent with her gestational age, EFW by Leopold's maneuver was 8#   Fetal heart rate:  Reassuring.   Pelvis:  Adequate pelvis  Cervix: closed  Contraction frequency:  none  Membranes:  Intact    Labs: CBC with Differential:    Lab Results   Component Value Date/Time    WBC 10.7 12/27/2022 09:43 AM    RBC 4.36 12/27/2022 09:43 AM    HGB 10.8 12/27/2022 09:43 AM    HCT 33.0 12/27/2022 09:43 AM     12/27/2022 09:43 AM    MCV 75.7 12/27/2022 09:43 AM    MCH 24.9 12/27/2022 09:43 AM    MCHC 32.8 12/27/2022 09:43 AM    RDW 15.7 12/27/2022 09:43 AM    LYMPHOPCT 24.9 12/27/2022 09:43 AM    MONOPCT 6.3 12/27/2022 09:43 AM    BASOPCT 0.6 12/27/2022 09:43 AM    MONOSABS 0.7 12/27/2022 09:43 AM    LYMPHSABS 2.7 12/27/2022 09:43 AM    EOSABS 0.2 12/27/2022 09:43 AM    BASOSABS 0.1 12/27/2022 09:43 AM     CMP:    Lab Results   Component Value Date/Time     12/27/2022 09:43 AM    K 4.2 12/27/2022 09:43 AM     12/27/2022 09:43 AM    CO2 22 12/27/2022 09:43 AM    BUN 9 12/27/2022 09:43 AM    CREATININE 0.6 12/27/2022 09:43 AM    GFRAA >60 11/07/2020 05:29 AM    AGRATIO 1.4 12/27/2022 09:43 AM    LABGLOM >60 12/27/2022 09:43 AM    GLUCOSE 96 12/27/2022 09:43 AM    PROT 5.5 12/27/2022 09:43 AM    LABALBU 3.2 12/27/2022 09:43 AM    CALCIUM 8.7 12/27/2022 09:43 AM    BILITOT <0.2 12/27/2022 09:43 AM    ALKPHOS 92 12/27/2022 09:43 AM    AST 16 12/27/2022 09:43 AM    ALT 15 12/27/2022 09:43 AM       ASSESSMENT AND PLAN:    H/o C/S-presents for repeat C/S @ 44 + weeks-s/b/r reviewed, consent obtained    HELENE Lara

## 2022-12-28 NOTE — LACTATION NOTE
This note was copied from a baby's chart. Lactation Progress Note      Data:   Initial lactation consult with multip after LTCS. Infant currently sts at right breast with on/off suck burst present. MOB states that she breast fed her first child directly for 5 months, then pumped her breast milk and bottle fed up to 1 year. Infant is 39 weeks gestation, MOB is IDM, follow sugars per protocol. LC to provide education and support. Output urine established    Action: Introduced self to patient as Lactation RN, name and phone number written on white board in room. Breastfeeding Booklet brought to room with contents reviewed. Assisted mob with positioning infant in cradle hold to right breast with pillows adjusted. Observed infant with annabelle noted with srs observed and several swallows noted over the next 30+ minutes and continues after consult. MOB states that infant latch is comfortable with strong tugging noted. MOB needing some hands on support during consult because of feeling nausea after delivery. Reviewed with mother what to expect over the next  24-48 hours with infant feedings, infant output, and breast care. Educated mother on how to hand express colostrum, and encouraged to do so with sleepy feeding attempts, q2-3 hours. Reviewed infant feeding cues and encouraged mother to allow infant to breast feed on demand, a minimum of 8-12 times a day after the first day of life. Also encouraged mother to avoid giving infant a pacifier or bottle for at least the first two weeks of life. Binder and breast feeding log reviewed, all questions answered. Mother instructed to call Lactation nurse for F/U care as needed. Response: MOB is pleased with infant's first feeding after consult. Verbalizes understanding of bf education that was provided. Will call for f/u care as needed during her stay.

## 2022-12-28 NOTE — LACTATION NOTE
This note was copied from a baby's chart. Lactation Progress Note      Data:    Follow up consult for multip on DOS with an infant born at 39.6 weeks gestation. MOB direct breast fed her first for 5 months then exclusively pumped and bottle fed until infant was 15months of age. MOB reports breast feeding is going ok, that she struggled with positioning at first but now that she can sit up that it's getting better. Feeding Method: Breastfeeding  Urine output: established   Stool output:  NOT established  Percent weight change from birth:  0%         Action:    Introduced self to patient as lactation, name and phone number written on white board in room. The 3 hour melisa was coming up so RN was called to check infants blood sugar before feed, BS WNL. MOB desired to try cross cradle at left breast. Infant was able to latch after a few tries but was on & off & MOB appeared uncomfortable. Suggested she try football position. Infant did better in football & MOB states it is better for her as well. Infant remained at the breast throughout the rest of consult nursing well. Reviewed with mother what to expect over the next  24-48 hours with infant feedings, infant output, how to know infant is getting enough, the importance of a deep latch and how to achieve it, how to break suction and try again if latch is shallow, normal  behavior, how to wake a sleepy infant to feed, how the breasts work to make milk, protecting milk supply, breastfeeding recommendations for exclusivity and duration, what to expect with cluster feeding, and breast care. Educated mother on how to hand express colostrum. Reviewed infant feeding cues and encouraged mother to allow infant to breast feed on demand anytime feeding cues are shown and if no feeding cues are shown to attempt to wake infant to feed every 2-3 hours.  If infant is still too sleepy to latch to hand express colostrum into infants mouth for about ten minutes, then try again in 2-3 hours. After the first day of life to breast feed a minimum of 8-12 times a day per 24 hour period. Also encouraged mother to avoid giving infant a pacifier, bottle, or pump for at least the first two weeks of life or until breast feeding is well established. Encouraged good hydration, nutrition, and rest, and to keep taking prenatal vitamin while lactating. Encouraged much skin to skin between mother and infant and father and infant. Breast feeding log reviewed, all questions answered. Mother instructed to call lactation for F/U care as needed. Response:    MOB pleased with feed, verbalized an understanding of education provided, and will call for assistance as needed.

## 2022-12-28 NOTE — PLAN OF CARE
Problem: Vaginal Birth or  Section  Goal: Fetal and maternal status remain reassuring during the birth process  Description:  Birth OB-Pregnancy care plan goal which identifies if the fetal and maternal status remain reassuring during the birth process  Outcome: Progressing     Problem: Postpartum  Goal: Experiences normal postpartum course  Description:  Postpartum OB-Pregnancy care plan goal which identifies if the mother is experiencing a normal postpartum course  Outcome: Progressing  Goal: Appropriate maternal -  bonding  Description:  Postpartum OB-Pregnancy care plan goal which identifies if the mother and  are bonding appropriately  Outcome: Progressing  Goal: Establishment of infant feeding pattern  Description:  Postpartum OB-Pregnancy care plan goal which identifies if the mother is establishing a feeding pattern with their   Outcome: Progressing  Goal: Incisions, wounds, or drain sites healing without S/S of infection  Outcome: Progressing     Problem: Pain  Goal: Verbalizes/displays adequate comfort level or baseline comfort level  Outcome: Progressing     Problem: Infection - Adult  Goal: Absence of infection at discharge  Outcome: Progressing  Goal: Absence of infection during hospitalization  Outcome: Progressing     Problem: Safety - Adult  Goal: Free from fall injury  Outcome: Progressing     Problem: Discharge Planning  Goal: Discharge to home or other facility with appropriate resources  Outcome: Progressing

## 2022-12-28 NOTE — L&D DELIVERY SUMMARY NOTE
[address] [address]                            LABOR AND DELIVERY NOTE    PATIENT NAME: Yakelin Benitez                  :        1990  MED REC NO:   [id number]                          ROOM:       3137  ACCOUNT NO:   [de-identified]                           ADMIT DATE: 2022  PROVIDER:     Kye David MD    DATE OF PROCEDURE:  2022    PREOPERATIVE DIAGNOSES:  Prior  section, declines trial of labor  after . POSTOPERATIVE DIAGNOSES:  Prior  section, declines trial of  labor after . PROCEDURE:  Repeat low transverse  section. SURGEON:  Kye David MD    ANESTHESIA:  Spinal.    SURGICAL BLOOD LOSS:  750 mL. FINDINGS:  Viable female infant 7 pounds 13 ounces, Apgars 8 at one  minuet and 9 at five minutes. Urine output 400 mL, clear yellow. INDICATIONS AND CONSENT:  A 28-year-old  2, para 1 who presents  at 44 plus weeks for repeat  section. Side effects, benefits,  risk were outlined and consent was obtained. All questions were  answered. The patient did receive preoperative antibiotics and did have  compression boots placed preoperatively. Consent was obtained. DESCRIPTION OF PROCEDURE:  The patient was moved to the operating room  and placed on the OR table in sitting position. Spinal anesthetic was  administered. The patient was placed in the OR table in supine position  in a 15-degree left lateral tilt. The patient was prepped and draped in  sterile fashion. A Berry catheter was placed in sterile fashion. Compression boots had been placed. The abdominal cavity was entered through prior Pfannenstiel incision in  interrupted layers. Vesicouterine peritoneum was taken off the lower  uterine segment. Uterine cavity was entered in a low-transverse manner  and extended laterally in blunt fashion.   Amniotomy was performed for  clear fluid. Vertex fetus was delivered through the incision without  difficulty. Delayed cord clamping was carried out. Baby was a vigorous  female infant. Placenta was manually removed after observation. Placenta was grossly  intact with a three-vessel cord. Bilateral tubes and ovaries appeared  normal.  Uterus was placed back into the abdominal cavity. Uterine  incision was closed with 0 Monocryl in continuous locking fashion. Two-layer closure was carried out for hemostatic purposes. Lateral  gutters were cleansed of any remaining blood, amniotic fluid and blood  clots, figure-of-eight was placed in the right angle of the uterine  incision with continued oozing. Hemostasis was then confirmed. Preliminary sponge and needle count was reported as correct. Peritoneum was closed with 3-0 Vicryl. Fascia was closed with 0 Maxon. Subcutaneous tissue was closed with 3-0 Vicryl. Skin was closed with  4-0 Vicryl in subcuticular fashion. Steri-Strips were placed. Sterile  dressing was placed. The patient was moved to the recovery room in good  condition. Final sponge and needle count was reported as correct. Surgical blood loss 750 mL. Urine output 400 mL. Female infant 7  pounds 13 ounces, Apgars 8 at one minute and 9 at five minutes.         Edgar Corea MD    D: 12/28/2022 9:56:08       T: 12/28/2022 9:59:12     [id number]  Job#: [id number]     Doc#: [id number]    CC:

## 2022-12-29 LAB
GLUCOSE BLD-MCNC: 92 MG/DL (ref 70–99)
HCT VFR BLD CALC: 26.3 % (ref 36–48)
HEMOGLOBIN: 8.6 G/DL (ref 12–16)
MCH RBC QN AUTO: 24.6 PG (ref 26–34)
MCHC RBC AUTO-ENTMCNC: 32.7 G/DL (ref 31–36)
MCV RBC AUTO: 75.2 FL (ref 80–100)
PDW BLD-RTO: 15.3 % (ref 12.4–15.4)
PERFORMED ON: NORMAL
PLATELET # BLD: 272 K/UL (ref 135–450)
PMV BLD AUTO: 8 FL (ref 5–10.5)
RBC # BLD: 3.49 M/UL (ref 4–5.2)
WBC # BLD: 10.3 K/UL (ref 4–11)

## 2022-12-29 PROCEDURE — 1220000000 HC SEMI PRIVATE OB R&B

## 2022-12-29 PROCEDURE — 2580000003 HC RX 258: Performed by: OBSTETRICS & GYNECOLOGY

## 2022-12-29 PROCEDURE — 85027 COMPLETE CBC AUTOMATED: CPT

## 2022-12-29 PROCEDURE — 36415 COLL VENOUS BLD VENIPUNCTURE: CPT

## 2022-12-29 PROCEDURE — 6370000000 HC RX 637 (ALT 250 FOR IP): Performed by: OBSTETRICS & GYNECOLOGY

## 2022-12-29 RX ORDER — IBUPROFEN 600 MG/1
600 TABLET ORAL EVERY 8 HOURS SCHEDULED
Status: DISCONTINUED | OUTPATIENT
Start: 2022-12-29 | End: 2022-12-30 | Stop reason: HOSPADM

## 2022-12-29 RX ADMIN — ACETAMINOPHEN 1000 MG: 500 TABLET ORAL at 22:19

## 2022-12-29 RX ADMIN — Medication 10 ML: at 22:20

## 2022-12-29 RX ADMIN — FERROUS SULFATE TAB 325 MG (65 MG ELEMENTAL FE) 325 MG: 325 (65 FE) TAB at 08:29

## 2022-12-29 RX ADMIN — ACETAMINOPHEN 1000 MG: 500 TABLET ORAL at 04:30

## 2022-12-29 RX ADMIN — DOCUSATE SODIUM 100 MG: 100 CAPSULE, LIQUID FILLED ORAL at 08:29

## 2022-12-29 RX ADMIN — IBUPROFEN 600 MG: 600 TABLET, FILM COATED ORAL at 08:29

## 2022-12-29 RX ADMIN — FERROUS SULFATE TAB 325 MG (65 MG ELEMENTAL FE) 325 MG: 325 (65 FE) TAB at 16:04

## 2022-12-29 RX ADMIN — ACETAMINOPHEN 1000 MG: 500 TABLET ORAL at 14:23

## 2022-12-29 RX ADMIN — IBUPROFEN 600 MG: 600 TABLET, FILM COATED ORAL at 16:04

## 2022-12-29 RX ADMIN — IBUPROFEN 600 MG: 600 TABLET, FILM COATED ORAL at 00:55

## 2022-12-29 RX ADMIN — DOCUSATE SODIUM 100 MG: 100 CAPSULE, LIQUID FILLED ORAL at 22:19

## 2022-12-29 ASSESSMENT — PAIN SCALES - GENERAL
PAINLEVEL_OUTOF10: 4
PAINLEVEL_OUTOF10: 2
PAINLEVEL_OUTOF10: 4
PAINLEVEL_OUTOF10: 1
PAINLEVEL_OUTOF10: 0

## 2022-12-29 NOTE — LACTATION NOTE
This note was copied from a baby's chart. Lactation Progress Note      Data:   Mother requests f/u. C/o sore nipples. 1 po. Mother breast fed her first baby x 5 months, and pumped until 1 year. MOB states that she had to use a nipple shield for the first couple weeks with every feeding, but continued to need to use the shield intermittently for the 5 months that she breast fed. Action: Introduced self as  [employer] on for this evening and offered support. Reviewed importance of MAYO, educating on how a good latch should look and feel vs shallow latch. Encouraged to call for  [employer] to assess the latch with the next feeding. Reviewed tips for good positioning and breast support and instruction provided on various breast feeding positions to try and tips for obtaining a deeper latch onto the breast/areola with feedings. Instruction provided on how to break the suction of the latch as needed using finger in the corner of infant's mouth and encouraged. Encouraged to notice nipple shape when baby releases from the breast, and discussed that nipple should be rounded without pinching or creasing. Breast feeding education reviewed on what to expect over the next 24-48 hours including breast care, expected  feeding behaviors, signs of hunger/satiety, how milk production works and types of milk mother will produce, educated on infant's belly size, and reassuring signs baby is getting enough at the breast. Reviewed care of sore nipples, and provided lanolin and hydrogel pads and educated on use. Reiterated importance of MAYO with feedings to promote healing and optimal milk transfer. Encouraged to offer the breast when infant is first beginning to root and show hunger cues, and every 2-3 hours if baby is sleepy and without feeding cues. Instructed that baby should have a minimum of 8-12 good feedings in a 24 hour period. Reviewed what to expect with cluster feeding behaviors, and the important role they play on milk supply. Name and number on whiteboard. Encouraged to call for f/u support prn. Response: Verbalized understanding of teaching provided. Will call for f/u support prn.

## 2022-12-29 NOTE — ANESTHESIA POSTPROCEDURE EVALUATION
Department of Anesthesiology  Postprocedure Note    Patient: Kinjal Teresa  MRN: [id number]  YOB: 1990  Date of evaluation: 2022      Procedure Summary     Date: 22 Room / Location: [facility] L&D OR [address] [place] [address]    Anesthesia Start: 33 Anesthesia Stop:     Procedure:  SECTION (Abdomen) Diagnosis:       H/O  section      (Repeat)      ([phone])    Surgeons: Kala Veloz MD Responsible Provider: Alex Quinones MD    Anesthesia Type: spinal ASA Status: 2          Anesthesia Type: No value filed.     Nick Phase I: Nick Score: 9    Nick Phase II: Nick Score: 10      Anesthesia Post Evaluation    Patient location during evaluation: bedside  Patient participation: complete - patient participated  Level of consciousness: awake and alert  Nausea & Vomiting: no nausea and no vomiting  Complications: no  Cardiovascular status: hemodynamically stable  Hydration status: stable

## 2022-12-29 NOTE — PROGRESS NOTES
Subjective:     Postpartum Day 1:  Delivery    The patient feels well. The patient denies emotional concerns. Pain is well controlled with current medications. The baby iswell. Baby is feeding via breast. Urinary output is adequate. The patient is ambulating well. The patient is tolerating a normal diet. Flatus denies been passed. Objective:    VITALS:  BP (!) 103/58   Pulse 89   Temp 98 °F (36.7 °C) (Oral)   Resp 16   SpO2 96%   Breastfeeding Unknown     Vitals:    22 0629   BP:    Pulse:    Resp:    Temp:    SpO2: 96%         General:    alert, appears stated age, and cooperative   Bowel Sounds:  active   Lochia:  appropriate   Uterine Fundus:   firm   Incision:  healing well, no significant drainage, no dehiscence, no significant erythema   DVT Evaluation:  No evidence of DVT seen on physical exam.     CBC   Lab Results   Component Value Date    WBC 10.7 2022    HGB 10.8 (L) 2022    HCT 33.0 (L) 2022    MCV 75.7 (L) 2022     2022        Assessment:     Status post  section. Doing well postoperatively. Plan:     Continue current care.     Daisy Delatorre MD

## 2022-12-29 NOTE — PROGRESS NOTES
First time get up to BR w/ assist x 2 RN's. Pt performed suyapa care per self after instruction. New peripad, gown, and underwear provided. Pt back to bed w/ out incident, gait steady. Pt tolerated well.

## 2022-12-29 NOTE — PLAN OF CARE
Problem: Postpartum  Goal: Experiences normal postpartum course  Description:  Postpartum OB-Pregnancy care plan goal which identifies if the mother is experiencing a normal postpartum course  2022 by Chris Suresh RN  Outcome: Progressing  2022 by Cesilia Luque RN  Outcome: Progressing  2022 06 by Shanna Berry RN  Outcome: Progressing     Problem: Postpartum  Goal: Appropriate maternal -  bonding  Description:  Postpartum OB-Pregnancy care plan goal which identifies if the mother and  are bonding appropriately  2022 by Chris Suresh RN  Outcome: Progressing  2022 by Cesilia Luque RN  Outcome: Progressing  2022 0632 by Shanna Berry RN  Outcome: Progressing     Problem: Postpartum  Goal: Establishment of infant feeding pattern  Description:  Postpartum OB-Pregnancy care plan goal which identifies if the mother is establishing a feeding pattern with their   2022 by Chris Suresh RN  Outcome: Progressing  2022 by Cesilia Luque RN  Outcome: Progressing  2022 0632 by Shanna Berry RN  Outcome: Progressing

## 2022-12-30 VITALS
DIASTOLIC BLOOD PRESSURE: 81 MMHG | HEART RATE: 86 BPM | RESPIRATION RATE: 18 BRPM | OXYGEN SATURATION: 98 % | TEMPERATURE: 98 F | SYSTOLIC BLOOD PRESSURE: 122 MMHG

## 2022-12-30 PROCEDURE — 90471 IMMUNIZATION ADMIN: CPT | Performed by: OBSTETRICS & GYNECOLOGY

## 2022-12-30 PROCEDURE — 6360000002 HC RX W HCPCS: Performed by: OBSTETRICS & GYNECOLOGY

## 2022-12-30 PROCEDURE — 90707 MMR VACCINE SC: CPT | Performed by: OBSTETRICS & GYNECOLOGY

## 2022-12-30 PROCEDURE — 6370000000 HC RX 637 (ALT 250 FOR IP): Performed by: OBSTETRICS & GYNECOLOGY

## 2022-12-30 RX ORDER — PSEUDOEPHEDRINE HCL 30 MG
100 TABLET ORAL 2 TIMES DAILY
Qty: 60 CAPSULE | Refills: 0 | Status: SHIPPED | OUTPATIENT
Start: 2022-12-30

## 2022-12-30 RX ORDER — IBUPROFEN 600 MG/1
600 TABLET ORAL EVERY 8 HOURS SCHEDULED
Qty: 60 TABLET | Refills: 0 | Status: SHIPPED | OUTPATIENT
Start: 2022-12-30

## 2022-12-30 RX ORDER — OXYCODONE HYDROCHLORIDE 5 MG/1
5 TABLET ORAL EVERY 6 HOURS PRN
Qty: 15 TABLET | Refills: 0 | Status: SHIPPED | OUTPATIENT
Start: 2022-12-30 | End: 2023-01-02

## 2022-12-30 RX ORDER — FERROUS SULFATE 325(65) MG
325 TABLET ORAL 2 TIMES DAILY WITH MEALS
Qty: 60 TABLET | Refills: 1 | Status: SHIPPED | OUTPATIENT
Start: 2022-12-30

## 2022-12-30 RX ADMIN — IBUPROFEN 600 MG: 600 TABLET, FILM COATED ORAL at 08:17

## 2022-12-30 RX ADMIN — ACETAMINOPHEN 1000 MG: 500 TABLET ORAL at 06:09

## 2022-12-30 RX ADMIN — IBUPROFEN 600 MG: 600 TABLET, FILM COATED ORAL at 01:32

## 2022-12-30 RX ADMIN — MEASLES, MUMPS, AND RUBELLA VIRUS VACCINE LIVE 0.5 ML: 1000; 12500; 1000 INJECTION, POWDER, LYOPHILIZED, FOR SUSPENSION SUBCUTANEOUS at 13:46

## 2022-12-30 RX ADMIN — DOCUSATE SODIUM 100 MG: 100 CAPSULE, LIQUID FILLED ORAL at 08:18

## 2022-12-30 RX ADMIN — FERROUS SULFATE TAB 325 MG (65 MG ELEMENTAL FE) 325 MG: 325 (65 FE) TAB at 08:17

## 2022-12-30 ASSESSMENT — PAIN DESCRIPTION - LOCATION: LOCATION: ABDOMEN;INCISION;OTHER (COMMENT)

## 2022-12-30 ASSESSMENT — PAIN DESCRIPTION - DESCRIPTORS: DESCRIPTORS: SORE

## 2022-12-30 ASSESSMENT — PAIN SCALES - GENERAL
PAINLEVEL_OUTOF10: 2
PAINLEVEL_OUTOF10: 0
PAINLEVEL_OUTOF10: 3

## 2022-12-30 ASSESSMENT — PAIN - FUNCTIONAL ASSESSMENT: PAIN_FUNCTIONAL_ASSESSMENT: ACTIVITIES ARE NOT PREVENTED

## 2022-12-30 NOTE — PROGRESS NOTES
Patient discharged home in stable condition with infant. Discharge instructions reviewed with patient and fob, Both verbalized an understanding. MMR given to patient before discharged. Patient in wheelchair, infant in car seat and placed on her lap.

## 2022-12-30 NOTE — PROGRESS NOTES
Subjective:     Postpartum Day 2:  Delivery    The patient feels well. The patient denies emotional concerns. Pain is well controlled with current medications. The baby iswell. Baby is feeding via breast. Urinary output is adequate. The patient is ambulating well. The patient is tolerating a normal diet. Flatus has been passed. Objective:    VITALS:  /81   Pulse 86   Temp 98 °F (36.7 °C) (Oral)   Resp 18   SpO2 98%   Breastfeeding Unknown     Vitals:    22 0828   BP: 122/81   Pulse: 86   Resp: 18   Temp: 98 °F (36.7 °C)   SpO2:          General:    alert and appears stated age   Bowel Sounds:  active   Lochia:  appropriate   Uterine Fundus:   firm   Incision:  healing well, no significant drainage, no dehiscence, no significant erythema   DVT Evaluation:  No evidence of DVT seen on physical exam.     CBC   Lab Results   Component Value Date    WBC 10.3 2022    HGB 8.6 (L) 2022    HCT 26.3 (L) 2022    MCV 75.2 (L) 2022     2022        Assessment:     Status post  section. Doing well postoperatively. Acute blood loss anemia      Plan:     Discharge home with standard precautions and return to clinic in 4-6 weeks.     Rosalinda Woods MD

## 2022-12-30 NOTE — DISCHARGE INSTRUCTIONS
Follow up with your OB doctor in 2 week for a  delivery or in 6 weeks for a vaginal delivery unless otherwise instructed, call the office for appointment. .  For breastfeeding support, you can contact our lactation specialists                                                                     DIET  Eat a well balanced diet focusing on foods high in fiber and protein  Drink plenty of fluids especially water. To avoid constipation you may take a mild stool softener as recommended by your doctor or midwife. ACTIVITY  Gradually increase your activity. Resume exercise regimen only after advised by your doctor or midwife. Avoid lifting anything heavier than your baby or a gallon of milk until OK'd by your physician or midlife. Avoid driving until your doctor or midwife has given their approval.  [employer] slowly from a lying to sitting and then a standing position. Climb stairs one at a time. Use caution when carrying your baby up and down the stairs. No sexual activity for 6 weeks or until advised by your doctor - Nothing in vagina: intercourse, tampons, or douching. Be prepared to discuss family planning at your follow-up OB visit. You may feel tired or have a lack of energy. You may continue your prenatal vitamin to replenish nutrients post delivery. Nap when infant naps to catch up on sleep. May return to work or school in 6 weeks or as directed by physician or midlife. Take pain medication as prescribed whenever you need them  Take care of yourself by sleeping/resting as much as possible. Let someone else care for you, your infant and housework as much as possible for a while. EMOTIONS  You may feed simon, sad, teary, & overwhelmed. If infant will not stop crying, contact another adult for help or place infant in their crib on their back and take a break. NEVER shake your infant.     Call your doctor if you have unrelieved feelings of: inability to cope, anxiety, lack of interest in the infant/family, eating and sleeping disturbances,     BLEEDING  Vaginal bleeding will decrease in amount over the next few weeks. It should be like the end of your period like a brownish discharge. No different odor or color than a regular period. You will notice that as your activity increases, your flow may increase. This is your body's way of telling you, you need to take things easier and rest more often. Abdominal cramping should not get any worse than it is now. Take your pain medications as needed for the next few days. BREAST CARE  For breastfeeding moms:  If you become engorged, feeding may be more difficult or painful for 1-2 days. You may find it helpful to hand express some milk so that the infant can latch on more easily. While breastfeeding, continue to take your prenatal vitamins as directed by your doctor or midwife. Only take medications verified as safe for breastfeeding. If you start any new medications other than the ones you have had in the hospital, contact your pediatrician to see if they are safe for breastfeeding. Wear a support bra 24/7. You can pump your milk after breast feeding and freeze milk for six months in the freezer. When you are ready to use it, thaw it out in the refrigerator and use in 24 hours. Label the containers made for breast milk with day and time of pumping. Establish breast feeding for 2 weeks before you pump breast milk to save. Use your lanolin ointment/cream on your nipples after baby nurses. You need not wipe it off when baby nurses again. There are nipple shields and disks for sore nipples. Babies should eat every two to three hours. Avoid gassy foods (cabbage, onions, saurkraut, baked beans, cauliflower, broccoli) and spicy [place] or [place] foods. They might give the baby gas or make your milk taste funny to baby. But if you have a craving, eat the food and see if it affects the baby and if it does, delete it from your diet.   If it does not affect the baby, go ahead and eat it. Avoid caffeine at bedtime. (chocolate has a lot of caffeine) if the baby is sensitive to it. For non-breastfeeding moms:  You may apply ice packs to your breasts over your bra for twenty minutes at a time for comfort. Avoid stimulation to your breasts, when showering allow the water to strike your back not your breasts. Wear a good fitting bra until your milk dries, such as a sports bra. If your breasts are very sore, buy a cabbage and wet some of the inner leaves and place in your bra over your breasts. Your breasts may feel warm when your milk comes in. This is normal.    INCISIONAL CARE / THERESA CARE  Clean your incision in the shower with mild soap. After shower pat the incision area dry and leave open to air. If used, Steri-stipes should fall off in shower by 2 weeks. If used, Robyn Tamir should be removed by the OB in office by 1 week. If used/ordered, an abdominal binder may provide support for your incision. Use the theresa-bottle after toileting until bleeding stops. It promotes healing and prevents infection. You are prone to urinary tract infections after a delivery ( c section or vaginal delivery). Drink a lot of fluids. Take a shower instead of a tub bath until bleeding stops. Cleanse your perineum from front to back  If used, stitches or internal clips will dissolve in 4-6 weeks. You may use a sitz bath or soak in a clean tub as needed for comfort. Kegel exercises will help restore bladder control. You may use cool compress on incision site. SWELLING   It takes about 2 weeks to get rid of all of the extra fluid you have from having a baby. Your feet and hands may swell up more than they are now. Keep your legs elevated when sitting or lying. When wearing stocking or socks, make sure they are not too tight. WHEN TO CALL THE DOCTOR  If you have a temp of 100.4 or more. Your abdomen is tender to touch.   You are passing blood clots bigger than the size of a lemon. If you are experiencing extreme weakness or dizziness. If you are having flu-like symptoms such as achy muscles or joints. If you have pain that cannot be relieved. You have persistent burning with urination or frequency. You have swelling, bleeding, drainage, foul odor, redness, or warmth in/around your incision or stitches. You have a red, warm, tender area in you calf. Call if you have concerns about your well-being or if you feel you may be showing signs of post partum depression, or have thoughts of harming yourself or infant. Increased pain at the site of the episiotomy. Difficulty urinating. Difficulty breathing with or without chest pain. Headache not relieved by pain meds. If you are saturating more than one maxi pad in an hour, foul smelling vaginal discharge, sudden continuing increased vaginal bleeding with or without clots. If you develop a warm, red, tender area on your breast, have nipple discharge which is foul smelling or contains pus, or develop a fever. NEVER SHAKE A BABY PROMISE. Shaking can kill a baby. It can also cause seizures, brain damage, learning problems,  Cerebral palsy, blindness and other serious health and developmental problems. I PROMISE NEVER TO SHAKE MY BABY    I understand that caregivers other than the mother often shakes babies. I also promise to discuss the dangers of shaking a baby with everyone who takes care of my baby. I promise to tell anyone who care for my baby to never, never shake my baby.

## 2022-12-30 NOTE — LACTATION NOTE
This note was copied from a baby's chart. Lactation Progress Note      Data:     F/U on multip breast feeder who is hoping to be d/c home today. Mob states that nipples became too sore over night to be able to latch baby. She initiated pumping and offering EBM per syringe. She plans to continue this until nipples are healed. Mob states that she is pumping Q H and collecting about 1-2 ml which she is feeding to baby per syringe. Output and weight loss are WNL. Action: Discharge plan discussed. Explained that breast should be stimulated at least 8-12 times per day, with pump or baby as nipples heal. Discouraged pumping Q H while healing. Explained that pedi may recommend increased volume for baby so she may have to use some formula until her breast milk is available. Encouraged to try baby back to breast in the next day or two. Offered f/u LC assistance as needed. Encouraged colostrum and lanolin for nipple healing and gel pads given for comfort. Reviewed well fed baby check list and encouraged to call [de-identified] or Outpatient for f/u as needed. Response: Verbalized understanding and comfortable with feeding plan for d/c.

## 2022-12-30 NOTE — PLAN OF CARE
Problem: Postpartum  Goal: Experiences normal postpartum course  Description:  Postpartum OB-Pregnancy care plan goal which identifies if the mother is experiencing a normal postpartum course  Outcome: Progressing     Problem: Postpartum  Goal: Appropriate maternal -  bonding  Description:  Postpartum OB-Pregnancy care plan goal which identifies if the mother and  are bonding appropriately  Outcome: Progressing     Problem: Postpartum  Goal: Establishment of infant feeding pattern  Description:  Postpartum OB-Pregnancy care plan goal which identifies if the mother is establishing a feeding pattern with their   Outcome: Progressing     Problem: Postpartum  Goal: Incisions, wounds, or drain sites healing without S/S of infection  Outcome: Progressing

## 2022-12-30 NOTE — PROGRESS NOTES
Discharge Phone Call    Patient Name: Roxie Lopez     [facility] Care Provider: Claire Maravilla MD Discharge Date: 2022    Disposition of baby:    Phone Number: [phone] (home)     Attempts to Contact:  Date:    Caller  Date:    Caller  Date:    Caller    Information for the patient's :  Elmer Hahn [[id number]]   Delivery Method: , Low Transverse     1. Now that you are at home is your pain being well controlled? Y/N   If no, instruct to call       provider. 2. Are you breastfeeding? Y/N    Do you need any extra support from our lactation staff? Y/N    If yes, provide number for lactation. 3. Have you made or already had your first appointment with the baby's doctor? Y/N   If no, do      you know when to schedule it? Y/N    4. Have you scheduled your follow-up appointment? Y/N  If no, do you know when to schedule       it? Y/N   If no, they can find it on printed discharge instructions. 5. Did staff discuss safe sleep during your stay? Y/N   6. Did we explain things in a way you could understand? Y/N  7. Were we respectful of your preferences for labor and birth and include you in the plan of       care? Y/N  If no, please explain _______________________________________________  8. Is there anyone in particular you would like to mention who provided care for you? _______      _________________________________________________________________________     9. Were you given a Post-Birth Warning Signs handout? Y/N  Do you have it somewhere      easily accessible? Y/N  If no, please send them a copy and ask them to put it somewhere      easily found. 10. Have you been crying excessively, having anger or mood swings that feel out of control, or       feel like you can't cope with caring for yourself or baby? Y/N   If yes, they may be showing       signs of postpartum depression and should call provider.  There is also a        depression test on page C5 in their discharge booklet they can take. 13. Do you have any other questions or concerns I can address today?  Y/N  ______________      _________________________________________________________________________    Information provided during call :_________________________________________________  ___________________________________________________________________________    Call completed by:____________________________    Date:_________ Time:___________

## 2022-12-30 NOTE — DISCHARGE SUMMARY
Obstetrical Discharge Form    Gestational Age:39w6d    Antepartum complications: none    Date of Delivery: 22    Type of Delivery:  for repeat    Delivered By:  Dr. Kindra Durbin:   Information for the patient's :  Ascencion Alvarez [[id number]]      Anesthesia: Spinal    Intrapartum complications: None    Postpartum complications: anemia    Discharge Medication:      Medication List        START taking these medications      docusate 100 MG Caps  Commonly known as: COLACE, DULCOLAX  Take 100 mg by mouth 2 times daily     ferrous sulfate 325 (65 Fe) MG tablet  Commonly known as: IRON 325  Take 1 tablet by mouth 2 times daily (with meals)     ibuprofen 600 MG tablet  Commonly known as: ADVIL;MOTRIN  Take 1 tablet by mouth every 8 hours     oxyCODONE 5 MG immediate release tablet  Commonly known as: ROXICODONE  Take 1 tablet by mouth every 6 hours as needed for Pain for up to 3 days. Max Daily Amount: 20 mg            ASK your doctor about these medications      acetaminophen 325 MG tablet  Commonly known as: TYLENOL     aspirin 81 MG EC tablet     * metFORMIN 500 MG tablet  Commonly known as: GLUCOPHAGE     * metFORMIN 500 MG extended release tablet  Commonly known as: GLUCOPHAGE-XR     ONE-A-DAY WOMENS PRENATAL 1 PO           * This list has 2 medication(s) that are the same as other medications prescribed for you. Read the directions carefully, and ask your doctor or other care provider to review them with you.                    Where to Get Your Medications        You can get these medications from any pharmacy    Bring a paper prescription for each of these medications  docusate 100 MG Caps  ferrous sulfate 325 (65 Fe) MG tablet  ibuprofen 600 MG tablet  oxyCODONE 5 MG immediate release tablet          Discharge Date: 22    Condition on discharge: Stable    Plan:   Follow up in 6 week(s)  Reviewed discharge instructions and scripts     Irene Yang MD

## 2024-07-18 ENCOUNTER — OFFICE VISIT (OUTPATIENT)
Dept: FAMILY MEDICINE CLINIC | Age: 34
End: 2024-07-18
Payer: COMMERCIAL

## 2024-07-18 VITALS — HEART RATE: 115 BPM | OXYGEN SATURATION: 98 % | TEMPERATURE: 98.1 F

## 2024-07-18 DIAGNOSIS — J02.9 SORE THROAT: Primary | ICD-10-CM

## 2024-07-18 LAB — S PYO AG THROAT QL: NORMAL

## 2024-07-18 PROCEDURE — 99213 OFFICE O/P EST LOW 20 MIN: CPT | Performed by: FAMILY MEDICINE

## 2024-07-18 PROCEDURE — 87880 STREP A ASSAY W/OPTIC: CPT | Performed by: FAMILY MEDICINE

## 2024-07-18 NOTE — PROGRESS NOTES
2024  Vianca Maurerafferty (:  1990)    Allergies:   Allergies   Allergen Reactions    Sulfa Antibiotics Hives     2022 -  2022 -          FLU/RESPIRATORY/COVID-19 CLINIC EVALUATION    HPI:   Chief Complaint   Patient presents with    Pharyngitis        SYMPTOMS:    INSTRUCTIONS:  \"[x]\" Indicates a positive item  \"[]\" Indicates a negative item        Symptom duration, days:    Date symptoms started : ____________    [] 1   [x] 2   [] 3   [] 4 - 7   [] 8 - 10   [] 11 - 13   [] >14    [x] Fevers    [] Symptom (not measured)   degrees  [] Measured (Result:  degrees)  [] Chills  [] Cough [] Dry [] Productive   []Loss of Taste  [] Loss of Smell  [x]Decreased Appetite  [] Coughing up blood  }  [] Chest Congestion  [] Nasal Congestion  [] Runny  Nose  [] Sneezing  [] Feeling short of breath   []Sometimes    [] Frequently    [] All the time     [] Chest pain     [x] Headaches  [x]Tolerable  [] Severe     [] Fatigue  [x] Sore throat  [] Muscle aches  [] Nausea  [] Vomiting  []Unable to keep fluids down     [] Diarrhea  [] Mild  []Severe       [] Vaccinated for COVID 19  [] History of COVID 19 (Date:           )      [x] OTHER SYMPTOMS: swollen lymph nodes      Symptom course:   [] Worsening     [] Stable     [] Improving      RISK FACTORS:1INSTRUCTIONS:  \"[x]\" Indicates a positive item.   Negative  for risk factors if not checked.    [] Close contact with a lab confirmed COVID-19 patient within 14 days of symptom onset  [] History of travel from affected geographical areas within 14 days of symptom onset        PHYSICAL EXAMINATION:    Vitals:    24 1552   Pulse: (!) 115   Temp: 98.1 °F (36.7 °C)   SpO2: 98%          [x] Alert  [x] Oriented to person/place/time    [x] No apparent distress   [] Toxic appearing  [] Face flushed appearing     [x] Normal Mood  [] Anxious appearing      [x] Sclera clear    [x] Pinna, TMs,  Canals normal bilaterally  [] TM Red  [] Right [] Left [] Bilateral  [] TM

## 2024-09-27 LAB
ABO, EXTERNAL RESULT: NORMAL
C. TRACHOMATIS, EXTERNAL RESULT: NEGATIVE
HEP B, EXTERNAL RESULT: NEGATIVE
HEPATITIS C ANTIBODY, EXTERNAL RESULT: NEGATIVE
HIV, EXTERNAL RESULT: NON REACTIVE
N. GONORRHOEAE, EXTERNAL RESULT: NEGATIVE
RH FACTOR, EXTERNAL RESULT: POSITIVE
RPR, EXTERNAL RESULT: NON REACTIVE
RUBELLA TITER, EXTERNAL RESULT: NORMAL

## 2024-11-06 ENCOUNTER — OFFICE VISIT (OUTPATIENT)
Dept: FAMILY MEDICINE CLINIC | Age: 34
End: 2024-11-06
Payer: COMMERCIAL

## 2024-11-06 ENCOUNTER — TELEPHONE (OUTPATIENT)
Dept: FAMILY MEDICINE CLINIC | Age: 34
End: 2024-11-06

## 2024-11-06 VITALS
DIASTOLIC BLOOD PRESSURE: 84 MMHG | TEMPERATURE: 97.9 F | WEIGHT: 252.4 LBS | SYSTOLIC BLOOD PRESSURE: 130 MMHG | BODY MASS INDEX: 41.05 KG/M2 | HEART RATE: 114 BPM | OXYGEN SATURATION: 99 %

## 2024-11-06 DIAGNOSIS — J01.90 ACUTE BACTERIAL SINUSITIS: Primary | ICD-10-CM

## 2024-11-06 DIAGNOSIS — B96.89 ACUTE BACTERIAL SINUSITIS: Primary | ICD-10-CM

## 2024-11-06 PROCEDURE — 99213 OFFICE O/P EST LOW 20 MIN: CPT | Performed by: NURSE PRACTITIONER

## 2024-11-06 RX ORDER — AMOXICILLIN 500 MG/1
1000 CAPSULE ORAL 2 TIMES DAILY
Qty: 40 CAPSULE | Refills: 0 | Status: SHIPPED | OUTPATIENT
Start: 2024-11-06 | End: 2024-11-16

## 2024-11-06 ASSESSMENT — ENCOUNTER SYMPTOMS
SINUS PAIN: 1
SHORTNESS OF BREATH: 0
CHEST TIGHTNESS: 0
FACIAL SWELLING: 1
SORE THROAT: 0
VOICE CHANGE: 0
WHEEZING: 0
SINUS PRESSURE: 1
TROUBLE SWALLOWING: 0
COUGH: 1

## 2024-11-06 ASSESSMENT — PATIENT HEALTH QUESTIONNAIRE - PHQ9
SUM OF ALL RESPONSES TO PHQ QUESTIONS 1-9: 0
1. LITTLE INTEREST OR PLEASURE IN DOING THINGS: NOT AT ALL
SUM OF ALL RESPONSES TO PHQ QUESTIONS 1-9: 0
SUM OF ALL RESPONSES TO PHQ9 QUESTIONS 1 & 2: 0
2. FEELING DOWN, DEPRESSED OR HOPELESS: NOT AT ALL

## 2024-11-06 NOTE — PROGRESS NOTES
Vianca Carrera  : 1990  Encounter date: 2024    This is a 34 y.o. female who presents with  Chief Complaint   Patient presents with    Congestion     Cough, congestion started a couple weeks ago.  Left side of face she states she is having TMJ pain, along with headache and ear pain.      History of present illness:    HPI   Presents to clinic today for concerns of congestion. She is currently 20 weeks pregnant. Symptoms started about 2 weeks ago, started out as basic cold-clear drainage and slight congestion-improved then started getting worse on Monday. Symptoms now include face is tender to palpate and headache plus TMJ-like symptoms all to the left side, congestion, coughing-productive-yellow sputum, and runny nose with yellow/green drainage. Denies fevers, chills, myalgias. Has tried pseudoephedrine, tylenol, saline sprays, humidifiers with minimal relief.     Allergies   Allergen Reactions    Sulfa Antibiotics Hives     2022 -  2022 -      Current Outpatient Medications   Medication Sig Dispense Refill    amoxicillin (AMOXIL) 500 MG capsule Take 2 capsules by mouth 2 times daily for 10 days 40 capsule 0    aspirin 81 MG EC tablet Take 1 tablet by mouth daily      metFORMIN (GLUCOPHAGE-XR) 500 MG extended release tablet Take 1 tablet by mouth daily      acetaminophen (TYLENOL) 325 MG tablet Take 2 tablets by mouth every 6 hours as needed for Pain      Prenat-Fe Carbonyl-FA-Omega 3 (ONE-A-DAY WOMENS PRENATAL 1 PO)        No current facility-administered medications for this visit.        Review of Systems   Constitutional:  Negative for chills and fever.   HENT:  Positive for congestion, ear pain (L ear), facial swelling (L side), sinus pressure (L side) and sinus pain (L side). Negative for dental problem, sore throat, trouble swallowing and voice change.    Respiratory:  Positive for cough. Negative for chest tightness, shortness of breath and wheezing.    Cardiovascular:  Negative

## 2025-02-25 ENCOUNTER — TELEPHONE (OUTPATIENT)
Dept: FAMILY MEDICINE CLINIC | Age: 35
End: 2025-02-25

## 2025-02-25 LAB — GBS, EXTERNAL RESULT: NEGATIVE

## 2025-02-25 RX ORDER — OSELTAMIVIR PHOSPHATE 75 MG/1
75 CAPSULE ORAL 2 TIMES DAILY
Qty: 10 CAPSULE | Refills: 0 | Status: SHIPPED | OUTPATIENT
Start: 2025-02-25 | End: 2025-03-02

## 2025-03-06 ENCOUNTER — ANESTHESIA (OUTPATIENT)
Dept: LABOR AND DELIVERY | Age: 35
End: 2025-03-06
Payer: COMMERCIAL

## 2025-03-06 ENCOUNTER — HOSPITAL ENCOUNTER (INPATIENT)
Age: 35
LOS: 3 days | Discharge: HOME | End: 2025-03-09
Attending: OBSTETRICS & GYNECOLOGY | Admitting: OBSTETRICS & GYNECOLOGY
Payer: COMMERCIAL

## 2025-03-06 ENCOUNTER — ANESTHESIA EVENT (OUTPATIENT)
Dept: LABOR AND DELIVERY | Age: 35
End: 2025-03-06
Payer: COMMERCIAL

## 2025-03-06 DIAGNOSIS — Z98.891 HX OF CESAREAN SECTION: Primary | ICD-10-CM

## 2025-03-06 LAB
ABO + RH BLD: NORMAL
ALBUMIN SERPL-MCNC: 3.3 G/DL (ref 3.4–5)
ALBUMIN/GLOB SERPL: 1 {RATIO} (ref 1.1–2.2)
ALP SERPL-CCNC: 134 U/L (ref 40–129)
ALT SERPL-CCNC: 20 U/L (ref 10–40)
AMPHETAMINES UR QL SCN>1000 NG/ML: NORMAL
ANION GAP SERPL CALCULATED.3IONS-SCNC: 13 MMOL/L (ref 3–16)
AST SERPL-CCNC: 31 U/L (ref 15–37)
BARBITURATES UR QL SCN>200 NG/ML: NORMAL
BASOPHILS # BLD: 0.1 K/UL (ref 0–0.2)
BASOPHILS NFR BLD: 0.4 %
BENZODIAZ UR QL SCN>200 NG/ML: NORMAL
BILIRUB SERPL-MCNC: 0.3 MG/DL (ref 0–1)
BLD GP AB SCN SERPL QL: NORMAL
BUN SERPL-MCNC: 8 MG/DL (ref 7–20)
BUPRENORPHINE+NOR UR QL SCN: NORMAL
CALCIUM SERPL-MCNC: 9.6 MG/DL (ref 8.3–10.6)
CANNABINOIDS UR QL SCN>50 NG/ML: NORMAL
CHLORIDE SERPL-SCNC: 106 MMOL/L (ref 99–110)
CO2 SERPL-SCNC: 21 MMOL/L (ref 21–32)
COCAINE UR QL SCN: NORMAL
CREAT SERPL-MCNC: 0.6 MG/DL (ref 0.6–1.1)
CREAT UR-MCNC: 30.6 MG/DL (ref 28–259)
DEPRECATED RDW RBC AUTO: 15.5 % (ref 12.4–15.4)
DRUG SCREEN COMMENT UR-IMP: NORMAL
EOSINOPHIL # BLD: 0.2 K/UL (ref 0–0.6)
EOSINOPHIL NFR BLD: 1.4 %
FENTANYL SCREEN, URINE: NORMAL
GFR SERPLBLD CREATININE-BSD FMLA CKD-EPI: >90 ML/MIN/{1.73_M2}
GLUCOSE SERPL-MCNC: 83 MG/DL (ref 70–99)
HCT VFR BLD AUTO: 33.7 % (ref 36–48)
HGB BLD-MCNC: 11 G/DL (ref 12–16)
LYMPHOCYTES # BLD: 2.7 K/UL (ref 1–5.1)
LYMPHOCYTES NFR BLD: 23.1 %
MCH RBC QN AUTO: 25.3 PG (ref 26–34)
MCHC RBC AUTO-ENTMCNC: 32.7 G/DL (ref 31–36)
MCV RBC AUTO: 77.2 FL (ref 80–100)
METHADONE UR QL SCN>300 NG/ML: NORMAL
MONOCYTES # BLD: 0.8 K/UL (ref 0–1.3)
MONOCYTES NFR BLD: 6.5 %
NEUTROPHILS # BLD: 8.2 K/UL (ref 1.7–7.7)
NEUTROPHILS NFR BLD: 68.6 %
OPIATES UR QL SCN>300 NG/ML: NORMAL
OXYCODONE UR QL SCN: NORMAL
PCP UR QL SCN>25 NG/ML: NORMAL
PH UR STRIP: 7 [PH]
PLATELET # BLD AUTO: 262 K/UL (ref 135–450)
PMV BLD AUTO: 8 FL (ref 5–10.5)
POTASSIUM SERPL-SCNC: 4.1 MMOL/L (ref 3.5–5.1)
PROT SERPL-MCNC: 6.6 G/DL (ref 6.4–8.2)
PROT UR-MCNC: 6.17 MG/DL
PROT/CREAT UR-RTO: 0.2 MG/DL
RBC # BLD AUTO: 4.37 M/UL (ref 4–5.2)
SODIUM SERPL-SCNC: 140 MMOL/L (ref 136–145)
WBC # BLD AUTO: 11.9 K/UL (ref 4–11)

## 2025-03-06 PROCEDURE — 6360000002 HC RX W HCPCS: Performed by: NURSE ANESTHETIST, CERTIFIED REGISTERED

## 2025-03-06 PROCEDURE — 86901 BLOOD TYPING SEROLOGIC RH(D): CPT

## 2025-03-06 PROCEDURE — 2709999900 HC NON-CHARGEABLE SUPPLY: Performed by: OBSTETRICS & GYNECOLOGY

## 2025-03-06 PROCEDURE — 2580000003 HC RX 258: Performed by: OBSTETRICS & GYNECOLOGY

## 2025-03-06 PROCEDURE — 7100000001 HC PACU RECOVERY - ADDTL 15 MIN: Performed by: OBSTETRICS & GYNECOLOGY

## 2025-03-06 PROCEDURE — 3700000000 HC ANESTHESIA ATTENDED CARE: Performed by: OBSTETRICS & GYNECOLOGY

## 2025-03-06 PROCEDURE — 3609079900 HC CESAREAN SECTION: Performed by: OBSTETRICS & GYNECOLOGY

## 2025-03-06 PROCEDURE — 80307 DRUG TEST PRSMV CHEM ANLYZR: CPT

## 2025-03-06 PROCEDURE — 7100000000 HC PACU RECOVERY - FIRST 15 MIN: Performed by: OBSTETRICS & GYNECOLOGY

## 2025-03-06 PROCEDURE — 2500000003 HC RX 250 WO HCPCS: Performed by: NURSE ANESTHETIST, CERTIFIED REGISTERED

## 2025-03-06 PROCEDURE — 82570 ASSAY OF URINE CREATININE: CPT

## 2025-03-06 PROCEDURE — 3700000001 HC ADD 15 MINUTES (ANESTHESIA): Performed by: OBSTETRICS & GYNECOLOGY

## 2025-03-06 PROCEDURE — 80053 COMPREHEN METABOLIC PANEL: CPT

## 2025-03-06 PROCEDURE — 86900 BLOOD TYPING SEROLOGIC ABO: CPT

## 2025-03-06 PROCEDURE — 85025 COMPLETE CBC W/AUTO DIFF WBC: CPT

## 2025-03-06 PROCEDURE — 84156 ASSAY OF PROTEIN URINE: CPT

## 2025-03-06 PROCEDURE — 86850 RBC ANTIBODY SCREEN: CPT

## 2025-03-06 PROCEDURE — 1220000000 HC SEMI PRIVATE OB R&B

## 2025-03-06 PROCEDURE — 6360000002 HC RX W HCPCS: Performed by: OBSTETRICS & GYNECOLOGY

## 2025-03-06 RX ORDER — IBUPROFEN 800 MG/1
800 TABLET, FILM COATED ORAL EVERY 8 HOURS
Status: DISCONTINUED | OUTPATIENT
Start: 2025-03-07 | End: 2025-03-09 | Stop reason: HOSPADM

## 2025-03-06 RX ORDER — SODIUM CHLORIDE, SODIUM LACTATE, POTASSIUM CHLORIDE, AND CALCIUM CHLORIDE .6; .31; .03; .02 G/100ML; G/100ML; G/100ML; G/100ML
1000 INJECTION, SOLUTION INTRAVENOUS ONCE
Status: DISCONTINUED | OUTPATIENT
Start: 2025-03-06 | End: 2025-03-06

## 2025-03-06 RX ORDER — PRENATAL WITH FERROUS FUM AND FOLIC ACID 3080; 920; 120; 400; 22; 1.84; 3; 20; 10; 1; 12; 200; 27; 25; 2 [IU]/1; [IU]/1; MG/1; [IU]/1; MG/1; MG/1; MG/1; MG/1; MG/1; MG/1; UG/1; MG/1; MG/1; MG/1; MG/1
1 TABLET ORAL DAILY
Status: DISCONTINUED | OUTPATIENT
Start: 2025-03-07 | End: 2025-03-09 | Stop reason: HOSPADM

## 2025-03-06 RX ORDER — FERROUS SULFATE 325(65) MG
325 TABLET ORAL 2 TIMES DAILY WITH MEALS
Status: DISCONTINUED | OUTPATIENT
Start: 2025-03-07 | End: 2025-03-09 | Stop reason: HOSPADM

## 2025-03-06 RX ORDER — SIMETHICONE 80 MG
80 TABLET,CHEWABLE ORAL EVERY 6 HOURS PRN
Status: DISCONTINUED | OUTPATIENT
Start: 2025-03-06 | End: 2025-03-09 | Stop reason: HOSPADM

## 2025-03-06 RX ORDER — SODIUM CHLORIDE 9 MG/ML
INJECTION, SOLUTION INTRAVENOUS PRN
Status: DISCONTINUED | OUTPATIENT
Start: 2025-03-06 | End: 2025-03-06

## 2025-03-06 RX ORDER — OXYCODONE HYDROCHLORIDE 5 MG/1
10 TABLET ORAL EVERY 4 HOURS PRN
Status: DISCONTINUED | OUTPATIENT
Start: 2025-03-06 | End: 2025-03-09 | Stop reason: HOSPADM

## 2025-03-06 RX ORDER — BUPIVACAINE HYDROCHLORIDE 7.5 MG/ML
INJECTION, SOLUTION INTRASPINAL
Status: DISCONTINUED | OUTPATIENT
Start: 2025-03-06 | End: 2025-03-06 | Stop reason: SDUPTHER

## 2025-03-06 RX ORDER — SODIUM CHLORIDE 0.9 % (FLUSH) 0.9 %
10 SYRINGE (ML) INJECTION PRN
Status: DISCONTINUED | OUTPATIENT
Start: 2025-03-06 | End: 2025-03-06

## 2025-03-06 RX ORDER — DOCUSATE SODIUM 100 MG/1
100 CAPSULE, LIQUID FILLED ORAL 2 TIMES DAILY PRN
Status: DISCONTINUED | OUTPATIENT
Start: 2025-03-06 | End: 2025-03-09 | Stop reason: HOSPADM

## 2025-03-06 RX ORDER — OXYCODONE HYDROCHLORIDE 5 MG/1
5 TABLET ORAL EVERY 4 HOURS PRN
Status: DISCONTINUED | OUTPATIENT
Start: 2025-03-06 | End: 2025-03-09 | Stop reason: HOSPADM

## 2025-03-06 RX ORDER — KETOROLAC TROMETHAMINE 30 MG/ML
30 INJECTION, SOLUTION INTRAMUSCULAR; INTRAVENOUS EVERY 6 HOURS
Status: DISPENSED | OUTPATIENT
Start: 2025-03-06 | End: 2025-03-07

## 2025-03-06 RX ORDER — SODIUM CHLORIDE 0.9 % (FLUSH) 0.9 %
5-40 SYRINGE (ML) INJECTION EVERY 12 HOURS SCHEDULED
Status: DISCONTINUED | OUTPATIENT
Start: 2025-03-06 | End: 2025-03-06

## 2025-03-06 RX ORDER — OXYTOCIN 10 [USP'U]/ML
INJECTION, SOLUTION INTRAMUSCULAR; INTRAVENOUS
Status: DISCONTINUED | OUTPATIENT
Start: 2025-03-06 | End: 2025-03-06 | Stop reason: SDUPTHER

## 2025-03-06 RX ORDER — ACETAMINOPHEN 325 MG/1
975 TABLET ORAL ONCE
Status: DISCONTINUED | OUTPATIENT
Start: 2025-03-06 | End: 2025-03-06

## 2025-03-06 RX ORDER — SODIUM CHLORIDE, SODIUM LACTATE, POTASSIUM CHLORIDE, CALCIUM CHLORIDE 600; 310; 30; 20 MG/100ML; MG/100ML; MG/100ML; MG/100ML
INJECTION, SOLUTION INTRAVENOUS CONTINUOUS
Status: DISCONTINUED | OUTPATIENT
Start: 2025-03-06 | End: 2025-03-06

## 2025-03-06 RX ORDER — FAMOTIDINE 10 MG/ML
INJECTION, SOLUTION INTRAVENOUS
Status: COMPLETED
Start: 2025-03-06 | End: 2025-03-06

## 2025-03-06 RX ORDER — ACETAMINOPHEN 500 MG
1000 TABLET ORAL EVERY 8 HOURS SCHEDULED
Status: DISCONTINUED | OUTPATIENT
Start: 2025-03-06 | End: 2025-03-09 | Stop reason: HOSPADM

## 2025-03-06 RX ORDER — SODIUM CHLORIDE, SODIUM LACTATE, POTASSIUM CHLORIDE, CALCIUM CHLORIDE 600; 310; 30; 20 MG/100ML; MG/100ML; MG/100ML; MG/100ML
INJECTION, SOLUTION INTRAVENOUS CONTINUOUS
Status: DISCONTINUED | OUTPATIENT
Start: 2025-03-06 | End: 2025-03-09 | Stop reason: HOSPADM

## 2025-03-06 RX ORDER — MORPHINE SULFATE 10 MG/ML
INJECTION, SOLUTION INTRAMUSCULAR; INTRAVENOUS
Status: DISCONTINUED | OUTPATIENT
Start: 2025-03-06 | End: 2025-03-06 | Stop reason: SDUPTHER

## 2025-03-06 RX ORDER — ONDANSETRON 2 MG/ML
4 INJECTION INTRAMUSCULAR; INTRAVENOUS EVERY 6 HOURS PRN
Status: DISCONTINUED | OUTPATIENT
Start: 2025-03-06 | End: 2025-03-09 | Stop reason: HOSPADM

## 2025-03-06 RX ORDER — ONDANSETRON 2 MG/ML
INJECTION INTRAMUSCULAR; INTRAVENOUS
Status: DISCONTINUED | OUTPATIENT
Start: 2025-03-06 | End: 2025-03-06 | Stop reason: SDUPTHER

## 2025-03-06 RX ORDER — ONDANSETRON 4 MG/1
4 TABLET, ORALLY DISINTEGRATING ORAL EVERY 8 HOURS PRN
Status: DISCONTINUED | OUTPATIENT
Start: 2025-03-06 | End: 2025-03-09 | Stop reason: HOSPADM

## 2025-03-06 RX ORDER — SODIUM CHLORIDE 9 MG/ML
INJECTION, SOLUTION INTRAVENOUS PRN
Status: DISCONTINUED | OUTPATIENT
Start: 2025-03-06 | End: 2025-03-09 | Stop reason: HOSPADM

## 2025-03-06 RX ORDER — SODIUM CHLORIDE 0.9 % (FLUSH) 0.9 %
5-40 SYRINGE (ML) INJECTION PRN
Status: DISCONTINUED | OUTPATIENT
Start: 2025-03-06 | End: 2025-03-09 | Stop reason: HOSPADM

## 2025-03-06 RX ORDER — SODIUM CHLORIDE 0.9 % (FLUSH) 0.9 %
5-40 SYRINGE (ML) INJECTION EVERY 12 HOURS SCHEDULED
Status: DISCONTINUED | OUTPATIENT
Start: 2025-03-06 | End: 2025-03-09 | Stop reason: HOSPADM

## 2025-03-06 RX ORDER — ONDANSETRON 2 MG/ML
4 INJECTION INTRAMUSCULAR; INTRAVENOUS EVERY 6 HOURS PRN
Status: DISCONTINUED | OUTPATIENT
Start: 2025-03-06 | End: 2025-03-06

## 2025-03-06 RX ORDER — FAMOTIDINE 10 MG/ML
INJECTION, SOLUTION INTRAVENOUS
Status: DISCONTINUED | OUTPATIENT
Start: 2025-03-06 | End: 2025-03-06 | Stop reason: SDUPTHER

## 2025-03-06 RX ADMIN — CEFAZOLIN 2000 MG: 2 INJECTION, POWDER, FOR SOLUTION INTRAVENOUS at 20:10

## 2025-03-06 RX ADMIN — MORPHINE SULFATE 0.15 MG: 10 INJECTION, SOLUTION INTRAMUSCULAR; INTRAVENOUS at 20:10

## 2025-03-06 RX ADMIN — FAMOTIDINE 20 MG: 10 INJECTION, SOLUTION INTRAVENOUS at 19:48

## 2025-03-06 RX ADMIN — BUPIVACAINE HYDROCHLORIDE 1.8 ML: 7.5 INJECTION, SOLUTION SUBARACHNOID at 20:10

## 2025-03-06 RX ADMIN — SODIUM CHLORIDE, SODIUM LACTATE, POTASSIUM CHLORIDE, AND CALCIUM CHLORIDE: .6; .31; .03; .02 INJECTION, SOLUTION INTRAVENOUS at 19:15

## 2025-03-06 RX ADMIN — OXYTOCIN 20 UNITS: 10 INJECTION, SOLUTION INTRAMUSCULAR; INTRAVENOUS at 20:38

## 2025-03-06 RX ADMIN — SODIUM CHLORIDE, SODIUM LACTATE, POTASSIUM CHLORIDE, AND CALCIUM CHLORIDE: .6; .31; .03; .02 INJECTION, SOLUTION INTRAVENOUS at 20:38

## 2025-03-06 RX ADMIN — ONDANSETRON 4 MG: 2 INJECTION INTRAMUSCULAR; INTRAVENOUS at 20:00

## 2025-03-06 RX ADMIN — SODIUM CHLORIDE, SODIUM LACTATE, POTASSIUM CHLORIDE, AND CALCIUM CHLORIDE: .6; .31; .03; .02 INJECTION, SOLUTION INTRAVENOUS at 23:48

## 2025-03-06 RX ADMIN — KETOROLAC TROMETHAMINE 30 MG: 30 INJECTION, SOLUTION INTRAMUSCULAR at 23:53

## 2025-03-06 ASSESSMENT — PAIN DESCRIPTION - ORIENTATION: ORIENTATION: LOWER

## 2025-03-06 ASSESSMENT — PAIN SCALES - GENERAL: PAINLEVEL_OUTOF10: 6

## 2025-03-06 ASSESSMENT — PAIN DESCRIPTION - LOCATION: LOCATION: ABDOMEN;INCISION

## 2025-03-06 ASSESSMENT — PAIN DESCRIPTION - DESCRIPTORS: DESCRIPTORS: BURNING;SHARP;ACHING

## 2025-03-06 NOTE — PROGRESS NOTES
PT sent over from office for preeclampsia w/u. PT ambulated to T1. Oriented to room and telephone. Pt provided urine sample and was placed on EFM. Pt states she is feeling the baby move. Denies any leaking of fluid or vaginal bleeding. Pt denies feeling ctx. Denies blurred vision, HA, N&V and epigastric pain. Denies any additional pain/concerns at this time.     Spoke with Dr. Marquez for orders before pt arrival.

## 2025-03-06 NOTE — PROGRESS NOTES
Dr. Marroquin notified of pt arrival and SBAR. Md reviewed labs and Bps and recommend proceeding c/s later today since pt ate around 1200. Pt agreeable to plan.

## 2025-03-06 NOTE — H&P
Department of Obstetrics and Gynecology  Labor and Delivery   History & Physical        CHIEF COMPLAINT:  elevated blood pressure    HISTORY OF PRESENT ILLNESS:      The patient is a 34 y.o. female 38w0d.    OB History          3    Para   2    Term   2            AB        Living   2         SAB        IAB        Ectopic        Molar        Multiple   0    Live Births   2              Patient presents with a chief complaint as above. Hx of C/S x 2, scheduled for repeat C/S next week.  Complaints of elevated BP at work.  Elevated in office.  Denies HA, blurred vision, N/V, RUQ pain.      Estimated Due Date:  Estimated Date of Delivery: 3/20/25    PAST MEDICAL HISTORY:   Past Medical History:   Diagnosis Date    Asthma     excercise induced    Kidney stones        PAST  SURGICAL HISTORY:   Past Surgical History:   Procedure Laterality Date    ADENOIDECTOMY       SECTION N/A 2020     SECTION performed by Gerson Ac MD at [facility]     SECTION N/A 2022     SECTION performed by Gallito Tuttle MD at [facility]    IR GUIDED NEPHROSTOMY CATH PLACEMENT LEFT  2020    IR NEPHROSTOMY PERCUTANEOUS LEFT 2020 [facility] SPECIAL PROCEDURES    KIDNEY STONE SURGERY      KNEE SURGERY      NEPHROLITHOTOMY Left 2020    LEFT PERCUTANEOUS NEPHROLITHOTOMY performed by Daiana Diaz MD at [facility] OR    WISDOM TOOTH EXTRACTION         SOCIAL HISTORY:     reports that she has never smoked. She has never used smokeless tobacco. She reports that she does not currently use alcohol. She reports that she does not use drugs.     MEDICATIONS:    Prior to Admission medications    Medication Sig Start Date End Date Taking? Authorizing Provider   aspirin 81 MG EC tablet Take 1 tablet by mouth daily   Yes Sherry Price MD   metFORMIN (GLUCOPHAGE-XR) 500 MG extended release tablet Take 1 tablet by mouth daily 22  Yes Sherry Price MD

## 2025-03-07 LAB
DEPRECATED RDW RBC AUTO: 15.6 % (ref 12.4–15.4)
HCT VFR BLD AUTO: 28.6 % (ref 36–48)
HGB BLD-MCNC: 9.6 G/DL (ref 12–16)
MCH RBC QN AUTO: 25.6 PG (ref 26–34)
MCHC RBC AUTO-ENTMCNC: 33.4 G/DL (ref 31–36)
MCV RBC AUTO: 76.9 FL (ref 80–100)
PLATELET # BLD AUTO: 239 K/UL (ref 135–450)
PMV BLD AUTO: 8.1 FL (ref 5–10.5)
RBC # BLD AUTO: 3.73 M/UL (ref 4–5.2)
WBC # BLD AUTO: 11.4 K/UL (ref 4–11)

## 2025-03-07 PROCEDURE — 6370000000 HC RX 637 (ALT 250 FOR IP): Performed by: OBSTETRICS & GYNECOLOGY

## 2025-03-07 PROCEDURE — 6360000002 HC RX W HCPCS: Performed by: OBSTETRICS & GYNECOLOGY

## 2025-03-07 PROCEDURE — 36415 COLL VENOUS BLD VENIPUNCTURE: CPT

## 2025-03-07 PROCEDURE — 2580000003 HC RX 258: Performed by: OBSTETRICS & GYNECOLOGY

## 2025-03-07 PROCEDURE — 1220000000 HC SEMI PRIVATE OB R&B

## 2025-03-07 PROCEDURE — 85027 COMPLETE CBC AUTOMATED: CPT

## 2025-03-07 RX ADMIN — SODIUM CHLORIDE, SODIUM LACTATE, POTASSIUM CHLORIDE, AND CALCIUM CHLORIDE: .6; .31; .03; .02 INJECTION, SOLUTION INTRAVENOUS at 08:30

## 2025-03-07 RX ADMIN — SIMETHICONE 80 MG: 80 TABLET, CHEWABLE ORAL at 06:21

## 2025-03-07 RX ADMIN — DOCUSATE SODIUM 100 MG: 100 CAPSULE, LIQUID FILLED ORAL at 20:13

## 2025-03-07 RX ADMIN — ONDANSETRON 4 MG: 2 INJECTION, SOLUTION INTRAMUSCULAR; INTRAVENOUS at 06:21

## 2025-03-07 RX ADMIN — ACETAMINOPHEN 1000 MG: 500 TABLET ORAL at 11:43

## 2025-03-07 RX ADMIN — KETOROLAC TROMETHAMINE 30 MG: 30 INJECTION, SOLUTION INTRAMUSCULAR at 06:12

## 2025-03-07 RX ADMIN — IBUPROFEN 800 MG: 800 TABLET, FILM COATED ORAL at 23:59

## 2025-03-07 RX ADMIN — KETOROLAC TROMETHAMINE 30 MG: 30 INJECTION, SOLUTION INTRAMUSCULAR at 17:11

## 2025-03-07 RX ADMIN — ACETAMINOPHEN 1000 MG: 500 TABLET ORAL at 20:12

## 2025-03-07 ASSESSMENT — PAIN DESCRIPTION - DESCRIPTORS
DESCRIPTORS: DISCOMFORT;SORE
DESCRIPTORS: BURNING;ACHING
DESCRIPTORS: SORE

## 2025-03-07 ASSESSMENT — PAIN - FUNCTIONAL ASSESSMENT
PAIN_FUNCTIONAL_ASSESSMENT: ACTIVITIES ARE NOT PREVENTED

## 2025-03-07 ASSESSMENT — PAIN SCALES - GENERAL
PAINLEVEL_OUTOF10: 2
PAINLEVEL_OUTOF10: 1
PAINLEVEL_OUTOF10: 2
PAINLEVEL_OUTOF10: 2
PAINLEVEL_OUTOF10: 1

## 2025-03-07 ASSESSMENT — PAIN DESCRIPTION - ORIENTATION
ORIENTATION: LOWER
ORIENTATION: MID;LOWER

## 2025-03-07 ASSESSMENT — PAIN DESCRIPTION - LOCATION
LOCATION: ABDOMEN;INCISION

## 2025-03-07 NOTE — PROGRESS NOTES
First time get up to BR w/ assist x 2 RN's. Pt performed suyapa care per self after instruction. New  peripad, and underwear provided. Pt back to bed w/ out incident, gait steady. Pt tolerated well. Comfort pad placed on bed. Complete linen change and gown changed.

## 2025-03-07 NOTE — LACTATION NOTE
This note was copied from a baby's chart.  LACTATION CONSULTATION  Initial Lactation Consult:   Referred by: RN request    : No    Name: Girl Vianca Carrera       MRN: [id number]         YOB: 2025   Time of Birth: 8:35 PM   Gestational age: Gestational Age: 38w0d   Birth Weight: Birth Weight: 3.53 kg (7 lb 12.5 oz) Most Recent Weight: Weight: 3.53 kg (7 lb 12.5 oz) (Filed from Delivery Summary)   Weight Change from Birth: 0%           Maternal Assessment:  Maternal Data:  Information for the patient's mother:  Vianca Carrera [[id number]]   34 y.o.  /Para:   Information for the patient's mother:  Vianca Carrera [[id number]]     Information for the patient's mother:  Vianca Carrera [[id number]]   38w0d    Prenatal Breastfeeding Education: Self Educations  and Prior Success in Breastfeeding     Prior Breastfeeding Experience:  other Children  and  for: 1st, 6months at breast, then pumped until 1 year, 2nd BF for about 1 month (2nd child had hip dysplasia which complicated positioning of baby to feed)    Breastfeeding Goal: Exclusively Breastfeed     Breast Assessment  Right Breast: Large  Right Nipple: Everts well   Right Areola: WDL   Right Nipple Comfort: comfortable   Right Nipple Integrity: Intact    Left Breast: Large  Left Nipple: Everts well   Left Areola: WDL   Left Nipple Comfort: comfortable   Left Nipple Integrity: Intact    Medications of Concern:None    Maternal Toxicology:   Information for the patient's mother:  Vianca Carrera [[id number]]     Barbiturate Screen, Ur   Date Value Ref Range Status   2025 Neg Negative <200 ng/mL Final     Benzodiazepine Screen, Urine   Date Value Ref Range Status   2025 Neg Negative <200 ng/mL Final     Cannabinoid Scrn, Ur   Date Value Ref Range Status   2025 Neg Negative <50 ng/mL Final     Cocaine Metabolite Screen, Urine   Date Value Ref Range Status   2025 Neg Negative

## 2025-03-07 NOTE — ANESTHESIA PROCEDURE NOTES
Spinal Block    Patient location during procedure: OB  End time: 3/6/2025 8:10 PM  Reason for block: primary anesthetic  Staffing  Performed: resident/CRNA   Anesthesiologist: Pete Grewal MD  Resident/CRNA: Deion Rios APRN - CRNA  Performed by: Deion Rios APRN - CRNA  Authorized by: Pete Grewal MD    Spinal Block  Patient position: sitting  Prep: Betadine  Patient monitoring: cardiac monitor, frequent blood pressure checks and continuous pulse ox  Approach: midline  Location: L3/L4  Provider prep: mask and sterile gloves  Local infiltration: lidocaine  Needle  Needle type: Pencan   Needle gauge: 25 G  Needle length: 3.5 in  Assessment  Sensory level: T4  Swirl obtained: Yes  CSF: clear  Attempts: 1  Hemodynamics: stable  Additional Notes  Sitting position betadine x3 Sterile prep and drape. Skin wheal with 1% xylocaine. SAB with 20 ga. Introducer. Clear CSF aspirated. Medication injected. Pt assisted supine. SALUD  Preanesthetic Checklist  Completed: patient identified, IV checked, risks and benefits discussed, equipment checked, pre-op evaluation, anesthesia consent given, oxygen available and monitors applied/VS acknowledged

## 2025-03-07 NOTE — PROGRESS NOTES
Subjective:     Postpartum Day 1:  Delivery    The patient feels well. The patient denies emotional concerns. Pain is well controlled with current medications. The baby iswell. Baby is feeding via breast. Urinary output is adequate. The patient is ambulating well. The patient is tolerating a normal diet.   Objective:    VITALS:  /69   Pulse 86   Temp 98.3 °F (36.8 °C) (Axillary)   Resp 16   Ht 1.676 m (5' 6\")   Wt 114.8 kg (253 lb)   SpO2 98%   Breastfeeding Unknown   BMI 40.84 kg/m²     Vitals:    25 0630   BP: 113/69   Pulse: 86   Resp: 16   Temp: 98.3 °F (36.8 °C)   SpO2: 98%         General:    alert, appears stated age, and cooperative   Bowel Sounds:  active   Lochia:  appropriate   Uterine Fundus:   firm   Incision:  healing well, no significant drainage, no dehiscence, no significant erythema   DVT Evaluation:  No evidence of DVT seen on physical exam.     CBC   Lab Results   Component Value Date    WBC 11.4 (H) 2025    HGB 9.6 (L) 2025    HCT 28.6 (L) 2025    MCV 76.9 (L) 2025     2025        Assessment:     Status post  section. Doing well postoperatively.   Gestational HTN    Plan:     Continue current care.  Close BP monitoring- normal range since delivery     Britt Brice MD

## 2025-03-07 NOTE — OP NOTE
[address] [address]                            OPERATIVE REPORT      PATIENT NAME: LIVE AYON            : 1990  MED REC NO: [id number]                      ROOM: 0390  ACCOUNT NO: [id number]                       ADMIT DATE: 2025  PROVIDER: Celia Marroquin MD      DATE OF PROCEDURE:  2025    SURGEON:  Celia Marroquin MD    PREOPERATIVE DIAGNOSES:  History of  section x2, gestational hypertension.    POSTOPERATIVE DIAGNOSES:  History of  section x2, gestational hypertension.    PROCEDURE:  Repeat low transverse  section.    ANESTHESIA:  Spinal.    ESTIMATED BLOOD LOSS:  940 mL.    COMPLICATIONS:  None.    CONDITION:  Stable.    FINDINGS:  Viable female infant, Apgars 9 and 9 at one and five minutes respectively.  Weight was 3530 g which is equivalent to 7 pounds 12.5 ounces.  The placenta was intact, 3-vessel cord.    HISTORY OF PRESENT ILLNESS:  The patient is a 34-year-old, G3, P2-0-0-2, who presented to the office at 38 weeks and 0 days with elevated blood pressure at work, in the prenatal visit it was elevated as well.  She was sent to triage for further evaluation.  The elevated blood pressures persisted.  The PIH labs were negative and she was asymptomatic; denying headache, blurred vision, nausea, vomiting, or right upper quadrant pain. Due to greater than 37 weeks and now with the diagnosis of gestational hypertension, the recommendation was to proceed with  today.  She was also known to be GDM A2 on 1000 mg of metformin every evening.  The risks and benefits of proceeding with repeat , the risks including anesthesia, infection, bleeding, and injury to other organs not limited to the bowel and bladder were reviewed with the patient.  She reported understanding and consented to proceed.    DESCRIPTION OF PROCEDURE:  She was taken to the

## 2025-03-07 NOTE — ANESTHESIA PRE PROCEDURE
Department of Anesthesiology  Preprocedure Note       Name:  Vianca Carrera   Age:  34 y.o.  :  1990                                          MRN:  [id number]         Date:  3/6/2025      Surgeon: Surgeon(s):  Celia Marroquin MD    Procedure: Procedure(s):   SECTION    Medications prior to admission:   Prior to Admission medications    Medication Sig Start Date End Date Taking? Authorizing Provider   aspirin 81 MG EC tablet Take 1 tablet by mouth daily   Yes Sherry Price MD   metFORMIN (GLUCOPHAGE-XR) 500 MG extended release tablet Take 1 tablet by mouth daily 22  Yes ProviderSherry MD   acetaminophen (TYLENOL) 325 MG tablet Take 2 tablets by mouth every 6 hours as needed for Pain   Yes ProviderSherry MD   Prenat-Fe Carbonyl-FA-Omega 3 (ONE-A-DAY WOMENS PRENATAL 1 PO)  10/1/18  Yes ProviderSherry MD       Current medications:    Current Facility-Administered Medications   Medication Dose Route Frequency Provider Last Rate Last Admin    lactated ringers infusion   IntraVENous Continuous Celia Marroquin  mL/hr at 25 New Bag at 25    lactated ringers bolus 1,000 mL  1,000 mL IntraVENous Once Celia Marroquin MD        sodium chloride flush 0.9 % injection 5-40 mL  5-40 mL IntraVENous 2 times per day Celia Marroquin MD        sodium chloride flush 0.9 % injection 10 mL  10 mL IntraVENous PRN Celia Marroquin MD        0.9 % sodium chloride infusion   IntraVENous PRN Celia Marroquin MD        acetaminophen (TYLENOL) tablet 975 mg  975 mg Oral Once Celia Marroquin MD        oxytocin (PITOCIN) 30 units in 500 mL infusion  87.3 sandy-units/min IntraVENous Continuous PRN Celia Marroquin MD        And    oxytocin (PITOCIN) 10 unit bolus from the bag  10 Units IntraVENous PRN Celia Marroquin MD        ondansetron (ZOFRAN) injection 4 mg  4 mg IntraVENous Q6H PRN

## 2025-03-07 NOTE — BRIEF OP NOTE
Brief Postoperative Note      Patient: Vianca Carrera  YOB: 1990  MRN: [id number]    Date of Procedure: 3/6/2025    Pre-Op Diagnosis Codes:      * Previous  section [Z98.891]    Post-Op Diagnosis: Same       Procedure(s):   SECTION    Surgeon(s):  Celia Marroquin MD    Assistant:  * No surgical staff found *    Anesthesia: Spinal    Estimated Blood Loss (mL): 940    Complications: None    Specimens:   * No specimens in log *    Implants:  * No implants in log *      Drains:   Nephrostomy Tube 1 Left 12 fr (Active)       Findings:  Infection Present At Time Of Surgery (PATOS) (choose all levels that have infection present):  No infection present  Other Findings: VFI, Apgars 9 and 9 at one and five minutes respectively  Wt: 3530 grams = 7 lbs 12,5 oz, placenta intact, 3 VC.      Electronically signed by Celia Marroquin MD on 3/6/2025 at 9:31 PM

## 2025-03-07 NOTE — LACTATION NOTE
This note was copied from a baby's chart.  LACTATION CONSULTATION      Follow-up Consult: Reason for Follow-up: Assess needs  and Provide education     F/u during lactation rounds with multip experienced breastfeeder. Mother reports baby continues latching comfortably and breastfeeding well. Mother  her first the longest for 6 months, then continued to pump and offer EBM until baby was 1 year old. Second baby had hip dysplasia that required bracing. Mother was able to DBF x 1 month, once bracing happened, positioning and latching to the breast became more difficult so mother pumped and offered EBM x 1 year.      Name: Girl Vianca Carrera       MRN: [id number]               YOB: 2025   Time of Birth: 8:35 PM   Gestational age: Gestational Age: 38w0d   Birth Weight: Birth Weight: 3.53 kg (7 lb 12.5 oz) Most Recent Weight: Weight: 3.53 kg (7 lb 12.5 oz) (Filed from Delivery Summary)   Weight Change from Birth: 0%            Maternal Assessment:      Maternal Data:   Information for the patient's mother:  Vianca Carrera [[id number]]   34 y.o.   /Para:   Information for the patient's mother:  Vianca Carrera [[id number]]       Information for the patient's mother:  Vianca Carrera [[id number]]   38w0d         Breast Assessment  Right Breast: Breasts not assessed this encounter   Left Breast: Breasts not assessed this encounter      Infant Assessment:      DOL:  15 hours      Feeding: Breastfeeding       Nipple Shield in Use: No     I&O adequacy:  Urine output: is established  Stool output: is not established  Percent weight change from birthweight: 0%     Oral Assessment:   VIKTORIYA infant asleep on consult    Birth Factors/Diagnosis that could create risk for breastfeeding:   Early Term      Intervention during consultation:     Interventions Performed:   Education     Latch & Positioning: Offered support with latching and encouraged to call for latch assessment with next

## 2025-03-07 NOTE — PLAN OF CARE
Problem: Vaginal Birth or  Section  Goal: Fetal and maternal status remain reassuring during the birth process  Description:  Birth OB-Pregnancy care plan goal which identifies if the fetal and maternal status remain reassuring during the birth process  Outcome: Completed     Problem: Postpartum  Goal: Experiences normal postpartum course  Description:  Postpartum OB-Pregnancy care plan goal which identifies if the mother is experiencing a normal postpartum course  Outcome: Progressing  Goal: Appropriate maternal -  bonding  Description:  Postpartum OB-Pregnancy care plan goal which identifies if the mother and  are bonding appropriately  Outcome: Progressing  Goal: Establishment of infant feeding pattern  Description:  Postpartum OB-Pregnancy care plan goal which identifies if the mother is establishing a feeding pattern with their   Outcome: Progressing  Goal: Incisions, wounds, or drain sites healing without S/S of infection  Outcome: Progressing     Problem: Pain  Goal: Verbalizes/displays adequate comfort level or baseline comfort level  Outcome: Progressing     Problem: Infection - Adult  Goal: Absence of infection at discharge  Outcome: Progressing  Goal: Absence of infection during hospitalization  Outcome: Progressing  Goal: Absence of fever/infection during anticipated neutropenic period  Outcome: Progressing     Problem: Safety - Adult  Goal: Free from fall injury  Outcome: Progressing     Problem: Discharge Planning  Goal: Discharge to home or other facility with appropriate resources  Outcome: Progressing     Problem: Chronic Conditions and Co-morbidities  Goal: Patient's chronic conditions and co-morbidity symptoms are monitored and maintained or improved  Outcome: Progressing     Problem: Neurosensory - Adult  Goal: Achieves maximal functionality and self care  Outcome: Progressing     Problem: Respiratory - Adult  Goal: Achieves optimal ventilation and

## 2025-03-08 PROCEDURE — 6370000000 HC RX 637 (ALT 250 FOR IP): Performed by: OBSTETRICS & GYNECOLOGY

## 2025-03-08 PROCEDURE — 2500000003 HC RX 250 WO HCPCS: Performed by: OBSTETRICS & GYNECOLOGY

## 2025-03-08 PROCEDURE — 1220000000 HC SEMI PRIVATE OB R&B

## 2025-03-08 RX ADMIN — Medication 10 ML: at 00:00

## 2025-03-08 RX ADMIN — FERROUS SULFATE TAB 325 MG (65 MG ELEMENTAL FE) 325 MG: 325 (65 FE) TAB at 09:07

## 2025-03-08 RX ADMIN — IBUPROFEN 800 MG: 800 TABLET, FILM COATED ORAL at 09:06

## 2025-03-08 RX ADMIN — DOCUSATE SODIUM 100 MG: 100 CAPSULE, LIQUID FILLED ORAL at 09:06

## 2025-03-08 RX ADMIN — DOCUSATE SODIUM 100 MG: 100 CAPSULE, LIQUID FILLED ORAL at 21:14

## 2025-03-08 RX ADMIN — ACETAMINOPHEN 1000 MG: 500 TABLET ORAL at 12:50

## 2025-03-08 RX ADMIN — Medication 1 ML: at 14:03

## 2025-03-08 RX ADMIN — IBUPROFEN 800 MG: 800 TABLET, FILM COATED ORAL at 17:30

## 2025-03-08 RX ADMIN — ACETAMINOPHEN 1000 MG: 500 TABLET ORAL at 21:14

## 2025-03-08 RX ADMIN — PRENATAL WITH FERROUS FUM AND FOLIC ACID 1 TABLET: 3080; 920; 120; 400; 22; 1.84; 3; 20; 10; 1; 12; 200; 27; 25; 2 TABLET ORAL at 09:06

## 2025-03-08 RX ADMIN — FERROUS SULFATE TAB 325 MG (65 MG ELEMENTAL FE) 325 MG: 325 (65 FE) TAB at 19:27

## 2025-03-08 RX ADMIN — ACETAMINOPHEN 1000 MG: 500 TABLET ORAL at 04:17

## 2025-03-08 ASSESSMENT — PAIN DESCRIPTION - DESCRIPTORS
DESCRIPTORS: SORE
DESCRIPTORS: SORE;DISCOMFORT
DESCRIPTORS: SORE

## 2025-03-08 ASSESSMENT — PAIN - FUNCTIONAL ASSESSMENT
PAIN_FUNCTIONAL_ASSESSMENT: ACTIVITIES ARE NOT PREVENTED

## 2025-03-08 ASSESSMENT — PAIN SCALES - GENERAL
PAINLEVEL_OUTOF10: 3
PAINLEVEL_OUTOF10: 2
PAINLEVEL_OUTOF10: 4
PAINLEVEL_OUTOF10: 4

## 2025-03-08 ASSESSMENT — PAIN DESCRIPTION - LOCATION
LOCATION: INCISION
LOCATION: ABDOMEN
LOCATION: ABDOMEN;INCISION
LOCATION: INCISION
LOCATION: INCISION

## 2025-03-08 ASSESSMENT — PAIN DESCRIPTION - ORIENTATION
ORIENTATION: LOWER

## 2025-03-08 NOTE — PLAN OF CARE
Problem: Postpartum  Goal: Experiences normal postpartum course  Description:  Postpartum OB-Pregnancy care plan goal which identifies if the mother is experiencing a normal postpartum course  3/7/2025 2031 by Beatriz Suazo RN  Outcome: Progressing  3/7/2025 0927 by Alta Herman RN  Outcome: Progressing  Goal: Appropriate maternal -  bonding  Description:  Postpartum OB-Pregnancy care plan goal which identifies if the mother and  are bonding appropriately  3/7/2025 2031 by Beatriz Suazo RN  Outcome: Progressing  3/7/2025 0927 by Alta Herman RN  Outcome: Progressing  Goal: Establishment of infant feeding pattern  Description:  Postpartum OB-Pregnancy care plan goal which identifies if the mother is establishing a feeding pattern with their   3/7/2025 2031 by Beatriz Suazo RN  Outcome: Progressing  3/7/2025 0927 by Alta Herman RN  Outcome: Progressing  Goal: Incisions, wounds, or drain sites healing without S/S of infection  3/7/2025 2031 by Beatriz Suazo RN  Outcome: Progressing  3/7/2025 0927 by Alta Herman RN  Outcome: Progressing     Problem: Pain  Goal: Verbalizes/displays adequate comfort level or baseline comfort level  3/7/2025 2031 by Beatriz Suazo RN  Outcome: Progressing  3/7/2025 0927 by Alta Herman RN  Outcome: Progressing     Problem: Infection - Adult  Goal: Absence of infection at discharge  3/7/2025 2031 by Beatriz Suazo RN  Outcome: Progressing  3/7/2025 0927 by Alta Herman RN  Outcome: Progressing  Goal: Absence of infection during hospitalization  3/7/2025 2031 by Beatriz Suazo RN  Outcome: Progressing  3/7/2025 0927 by Alta Herman RN  Outcome: Progressing  Goal: Absence of fever/infection during anticipated neutropenic period  3/7/2025 0927 by Alta Herman RN  Outcome: Progressing     Problem: Safety - Adult  Goal: Free from fall injury  3/7/2025 2031 by Betariz Suazo RN  Outcome: Progressing  3/7/2025 0927 by Alta Herman RN  Outcome:

## 2025-03-08 NOTE — LACTATION NOTE
This note was copied from a baby's chart.  Lactation Progress Note      Data:    F/U consult for multip experienced breast feeder on day 1 po with an infant born at 38.0 weeks gestation. MOB breast fed her others. First infant was direct breast fed until 6 months when she got teeth, mother then pumped and bottle fed until 12 months. Second infant needed hip braces at 1 month which made direct BF difficult so she pumped and bottle fed for 12 months.     MOB reports current infant has been latching well but has been very sleepy and spitty. FOB calling for consult. At time of consult, mother had infant latched deeply at left breast in cross cradle.       MOB with a history of gestational diabetes and gestational hypertension.       Urine output: established   Stool output: established  Percent weight change from birth:  0%    Education:    Introduced self to patient as lactation, name and phone number written on white board in room. Discuss how great latch looked and felt and how breastfeeding has been going. Reassured mother about normal  behavior, infant output & how to know infant is getting enough, and what to expect with cluster feeding.     Reviewed infant feeding cues and encouraged mother to allow infant to breast feed on demand anytime feeding cues are shown and if no feeding cues are shown to attempt to wake infant to feed every 2-3 hours for a minimum of 8-12 feeds a day per 24 hour period.     Also encouraged mother to avoid giving infant a pacifier, bottle, or pump for at least the first two weeks of life or until breast feeding is well established. Encouraged good hydration, nutrition, and rest, and to keep taking prenatal or multivitamin while lactating. Encouraged much skin to skin between mother and infant and father and infant. All questions answered. Mother encouraged to call lactation for F/U care as needed.     Response:    MOB verbalized an understanding of education provided and will call

## 2025-03-08 NOTE — ANESTHESIA POSTPROCEDURE EVALUATION
Department of Anesthesiology  Postprocedure Note    Patient: Vianca Carrera  MRN: [id number]  YOB: 1990  Date of evaluation: 3/8/2025    Procedure Summary       Date: 25 Room / Location: [facility] [address] [place] [address]    Anesthesia Start:  Anesthesia Stop:     Procedure:  SECTION (Abdomen) Diagnosis:       Previous  section      (Previous  section [Z98.891])    Surgeons: Celia Marroquin MD Responsible Provider: Pete Grewal MD    Anesthesia Type: Spinal ASA Status: 3 - Emergent            Anesthesia Type: Spinal    Nick Phase I: Nick Score: 10    Nick Phase II: Nick Score: 10    Anesthesia Post Evaluation    Patient location during evaluation: bedside  Patient participation: complete - patient participated  Level of consciousness: awake and alert  Airway patency: patent  Nausea & Vomiting: no vomiting and no nausea  Cardiovascular status: hemodynamically stable  Respiratory status: spontaneous ventilation and room air  Hydration status: stable  Multimodal analgesia pain management approach  Pain management: adequate and satisfactory to patient        No notable events documented.

## 2025-03-08 NOTE — FLOWSHEET NOTE
Three steri strips replaced on pt's abdominal incision on right side per DERIAN Bates, verbal order. Incision clean, dry, intact, no drainage or odor noted.

## 2025-03-08 NOTE — PROGRESS NOTES
Subjective:     Postpartum Day 2:  Delivery    The patient feels well. The patient denies emotional concerns. Pain is well controlled with current medications. The baby is well. Baby is feeding via breast. Urinary output is adequate. The patient is ambulating well. Denies SOB/dizziness with ambulating. The patient is tolerating a normal diet. Flatus has been passed.    Objective:      Patient Vitals for the past 8 hrs:   BP Temp Temp src Pulse Resp SpO2   25 0416 109/66 97.5 °F (36.4 °C) Oral 78 16 97 %     General:    alert, appears stated age, and cooperative   Bowel Sounds:  active   Lochia:  appropriate   Uterine Fundus:   firm   Incision:  healing well, no significant drainage, no dehiscence, no significant erythema   DVT Evaluation:  No evidence of DVT seen on physical exam.   CBC:   Lab Results   Component Value Date/Time    WBC 11.4 2025 05:59 AM    RBC 3.73 2025 05:59 AM    HGB 9.6 2025 05:59 AM    HCT 28.6 2025 05:59 AM    MCV 76.9 2025 05:59 AM    MCH 25.6 2025 05:59 AM    MCHC 33.4 2025 05:59 AM    RDW 15.6 2025 05:59 AM     2025 05:59 AM    MPV 8.1 2025 05:59 AM      Assessment:     Status post  section. Doing well postoperatively.   GHTN-b/ps normal range since delivery  Mild anemia secondary to acute blood loss at deliverty  Stable breastfeeding infant female  Plan:     Continue current care.  Lactation support as needed  Anticipate discharge tomorrow

## 2025-03-09 VITALS
SYSTOLIC BLOOD PRESSURE: 128 MMHG | RESPIRATION RATE: 18 BRPM | HEIGHT: 66 IN | BODY MASS INDEX: 40.66 KG/M2 | DIASTOLIC BLOOD PRESSURE: 77 MMHG | OXYGEN SATURATION: 97 % | TEMPERATURE: 97.9 F | WEIGHT: 253 LBS | HEART RATE: 92 BPM

## 2025-03-09 PROCEDURE — 6370000000 HC RX 637 (ALT 250 FOR IP): Performed by: OBSTETRICS & GYNECOLOGY

## 2025-03-09 RX ORDER — IBUPROFEN 800 MG/1
800 TABLET, FILM COATED ORAL EVERY 8 HOURS PRN
Qty: 60 TABLET | Refills: 0 | Status: SHIPPED | OUTPATIENT
Start: 2025-03-09

## 2025-03-09 RX ORDER — FERROUS SULFATE 325(65) MG
325 TABLET ORAL
Qty: 30 TABLET | Refills: 1 | Status: SHIPPED | OUTPATIENT
Start: 2025-03-09

## 2025-03-09 RX ORDER — OXYCODONE HYDROCHLORIDE 5 MG/1
5 TABLET ORAL EVERY 8 HOURS PRN
Qty: 6 TABLET | Refills: 0 | Status: SHIPPED | OUTPATIENT
Start: 2025-03-09 | End: 2025-03-11

## 2025-03-09 RX ADMIN — IBUPROFEN 800 MG: 800 TABLET, FILM COATED ORAL at 01:03

## 2025-03-09 RX ADMIN — IBUPROFEN 800 MG: 800 TABLET, FILM COATED ORAL at 10:11

## 2025-03-09 RX ADMIN — DOCUSATE SODIUM 100 MG: 100 CAPSULE, LIQUID FILLED ORAL at 10:14

## 2025-03-09 RX ADMIN — FERROUS SULFATE TAB 325 MG (65 MG ELEMENTAL FE) 325 MG: 325 (65 FE) TAB at 10:14

## 2025-03-09 RX ADMIN — PRENATAL WITH FERROUS FUM AND FOLIC ACID 1 TABLET: 3080; 920; 120; 400; 22; 1.84; 3; 20; 10; 1; 12; 200; 27; 25; 2 TABLET ORAL at 10:11

## 2025-03-09 RX ADMIN — ACETAMINOPHEN 1000 MG: 500 TABLET ORAL at 05:43

## 2025-03-09 ASSESSMENT — PAIN - FUNCTIONAL ASSESSMENT
PAIN_FUNCTIONAL_ASSESSMENT: ACTIVITIES ARE NOT PREVENTED
PAIN_FUNCTIONAL_ASSESSMENT: ACTIVITIES ARE NOT PREVENTED

## 2025-03-09 ASSESSMENT — PAIN DESCRIPTION - DESCRIPTORS
DESCRIPTORS: SORE
DESCRIPTORS: SORE

## 2025-03-09 ASSESSMENT — PAIN DESCRIPTION - LOCATION
LOCATION: ABDOMEN;INCISION
LOCATION: ABDOMEN;INCISION

## 2025-03-09 ASSESSMENT — PAIN SCALES - GENERAL
PAINLEVEL_OUTOF10: 5
PAINLEVEL_OUTOF10: 4

## 2025-03-09 NOTE — DISCHARGE INSTRUCTIONS
minutes at a time for comfort.  Do not express breast milk.  Avoid stimulation to your breasts. When showering, allow the water to strike only your back.  Wear a good fitting bra, such as a sports bra, until your milk dries up.     OTHER REASONS TO CALL YOUR DOCTOR    Your abdomen is tender to touch or you have pain that cannot be relieved.  Flu-like symptoms such as achy muscles and joints or you are experiencing extreme weakness or dizziness.  Persistent burning or increased urgency in urination.      LITERATURE PROVIDED    For Moms and Those who Care about Them.  Your [employer] Healthy Start, Grow Smart.  Breastfeeding Best for Baby, Best for Mom.  [facility] Parent Information about Universal Hearing Screening.  Controlling Pain.    I have received the educational material listed above,  [employer] has provided me with the opportunity to view instructional videos pertaining to infant care and the care of myself.  I verify that I have received the above information and have no further questions and feel confident to care for myself and my baby.    For more information about postpartum care or baby care and feeding, create a [facility] My Chart account, sign in and search using the magnifying glass and type in postpartum, breastfeeding or formula feeding. [email or web address]

## 2025-03-09 NOTE — PROGRESS NOTES
Discharge Phone Call    Patient Name: Vianca Carrera     OB Care Provider: Celia Marroquin MD Discharge Date: 3/9/2025    Disposition of baby:    Phone Number: [phone] (home)     Attempts to Contact:  Date:    Caller  Date:    Caller  Date:    Caller    Information for the patient's :  Bob Carrera [[id number]]   Delivery Method: , Low Transverse    1.  Now that you are at home is your pain being well controlled?   Y/N   If no, instruct to call       provider.      2. Are you breastfeeding?    Y/N    Do you need any extra support from our lactation staff?      Y/N    If yes, provide number for lactation.  3. Have you made or already had your first appointment with the baby's doctor? Y/N   If no, do      you know when to schedule it?  Y/N    4. Have you scheduled your follow-up appointment?  Y/N  If no, do you know when to schedule       it?    Y/N   If no, they can find it on printed discharge instructions.  5. Did staff discuss safe sleep during your stay? Y/N   6. Did we explain things in a way you could understand?  Y/N  7. Were we respectful of your preferences for labor and birth and include you in the plan of       care?  Y/N  If no, please explain _______________________________________________  8. Is there anyone in particular you would like to mention who provided care for you? _______      _________________________________________________________________________     9. Were you given a Post-Birth Warning Signs handout?  Y/N  Do you have it somewhere      easily accessible?  Y/N  If no, please send them a copy and ask them to put it somewhere      easily found.  10. Have you been crying excessively, having anger or mood swings that feel out of control, or       feel like you can't cope with caring for yourself or baby? Y/N   If yes, they may be showing       signs of postpartum depression and should call provider. There is also a        depression test on

## 2025-03-09 NOTE — PROGRESS NOTES
Subjective:     Postpartum Day 3:  Delivery    The patient feels well. The patient denies emotional concerns. Pain is well controlled with current medications. The baby iswell. Baby is feeding via breast. Urinary output is adequate. The patient is ambulating well. The patient is tolerating a normal diet. Flatus has been passed.    Objective:    VITALS:  /77   Pulse 92   Temp 97.9 °F (36.6 °C) (Oral)   Resp 18   Ht 1.676 m (5' 6\")   Wt 114.8 kg (253 lb)   SpO2 97%   Breastfeeding Unknown   BMI 40.84 kg/m²     Vitals:    25 0542   BP: 128/77   Pulse: 92   Resp: 18   Temp: 97.9 °F (36.6 °C)   SpO2: 97%         General:    alert, appears stated age, and cooperative   Bowel Sounds:  active   Lochia:  appropriate   Uterine Fundus:   firm   Incision:  healing well, no significant drainage, no dehiscence, no significant erythema   DVT Evaluation:  No evidence of DVT seen on physical exam.     CBC   Lab Results   Component Value Date    WBC 11.4 (H) 2025    HGB 9.6 (L) 2025    HCT 28.6 (L) 2025    MCV 76.9 (L) 2025     2025        Assessment:     Status post  section. Doing well postoperatively.   GHTN- BPs wnl since delivery    Plan:     Discharge home with standard precautions and return to clinic in 4-6 weeks.    Britt Brice MD

## 2025-03-09 NOTE — PLAN OF CARE
Problem: Postpartum  Goal: Experiences normal postpartum course  Description:  Postpartum OB-Pregnancy care plan goal which identifies if the mother is experiencing a normal postpartum course  3/8/2025 2133 by Beatriz Suazo RN  Outcome: Progressing  3/8/2025 1933 by Lyubov Garza RN  Outcome: Progressing  Goal: Appropriate maternal -  bonding  Description:  Postpartum OB-Pregnancy care plan goal which identifies if the mother and  are bonding appropriately  3/8/2025 2133 by Beatriz Suazo RN  Outcome: Progressing  3/8/2025 1933 by Lyubov Garza RN  Outcome: Progressing  Goal: Establishment of infant feeding pattern  Description:  Postpartum OB-Pregnancy care plan goal which identifies if the mother is establishing a feeding pattern with their   3/8/2025 2133 by Beatriz Suazo RN  Outcome: Progressing  3/8/2025 1933 by Lyubov Garza RN  Outcome: Progressing  Goal: Incisions, wounds, or drain sites healing without S/S of infection  3/8/2025 2133 by Beatriz Suazo RN  Outcome: Progressing  3/8/2025 1933 by Lyubov Garza RN  Outcome: Progressing  Flowsheets (Taken 3/8/2025 1229)  Incisions, Wounds, or Drain Sites Healing Without Sign and Symptoms of Infection:   ADMISSION and DAILY: Assess and document risk factors for pressure ulcer development   TWICE DAILY: Assess and document skin integrity   TWICE DAILY: Assess and document dressing/incision, wound bed, drain sites and surrounding tissue     Problem: Pain  Goal: Verbalizes/displays adequate comfort level or baseline comfort level  3/8/2025 2133 by Beatriz Suazo RN  Outcome: Progressing  3/8/2025 1933 by Lyubov Garza RN  Outcome: Progressing  Flowsheets (Taken 3/8/2025 1229)  Verbalizes/displays adequate comfort level or baseline comfort level:   Encourage patient to monitor pain and request assistance   Assess pain using appropriate pain scale   Administer analgesics based on type and severity of pain and

## 2025-03-09 NOTE — PLAN OF CARE
Problem: Postpartum  Goal: Experiences normal postpartum course  Description:  Postpartum OB-Pregnancy care plan goal which identifies if the mother is experiencing a normal postpartum course  Outcome: Progressing  Goal: Appropriate maternal -  bonding  Description:  Postpartum OB-Pregnancy care plan goal which identifies if the mother and  are bonding appropriately  Outcome: Progressing  Goal: Establishment of infant feeding pattern  Description:  Postpartum OB-Pregnancy care plan goal which identifies if the mother is establishing a feeding pattern with their   Outcome: Progressing  Goal: Incisions, wounds, or drain sites healing without S/S of infection  Outcome: Progressing  Flowsheets (Taken 3/8/2025 1229)  Incisions, Wounds, or Drain Sites Healing Without Sign and Symptoms of Infection:   ADMISSION and DAILY: Assess and document risk factors for pressure ulcer development   TWICE DAILY: Assess and document skin integrity   TWICE DAILY: Assess and document dressing/incision, wound bed, drain sites and surrounding tissue     Problem: Pain  Goal: Verbalizes/displays adequate comfort level or baseline comfort level  Outcome: Progressing  Flowsheets (Taken 3/8/2025 1223)  Verbalizes/displays adequate comfort level or baseline comfort level:   Encourage patient to monitor pain and request assistance   Assess pain using appropriate pain scale   Administer analgesics based on type and severity of pain and evaluate response   Implement non-pharmacological measures as appropriate and evaluate response     Problem: Infection - Adult  Goal: Absence of infection at discharge  Outcome: Progressing  Goal: Absence of infection during hospitalization  Outcome: Progressing  Goal: Absence of fever/infection during anticipated neutropenic period  Outcome: Progressing     Problem: Safety - Adult  Goal: Free from fall injury  Outcome: Progressing     Problem: Discharge Planning  Goal: Discharge to home or

## 2025-03-09 NOTE — DISCHARGE SUMMARY
Obstetrical Discharge Form    Gestational Age:38w0d    Antepartum complications: GHTN    Date of Delivery: 3/6/25    Type of Delivery:  for  repeat    Delivered By:  Dr. Marroquin         Baby:   Information for the patient's :  Bob Carrera [[id number]]      Anesthesia: Spinal    Intrapartum complications: None    Postpartum complications: none    Discharge Medication:      Medication List        START taking these medications      ferrous sulfate 325 (65 Fe) MG tablet  Commonly known as: IRON 325  Take 1 tablet by mouth daily (with breakfast)     ibuprofen 800 MG tablet  Commonly known as: ADVIL;MOTRIN  Take 1 tablet by mouth every 8 hours as needed for Pain     oxyCODONE 5 MG immediate release tablet  Commonly known as: ROXICODONE  Take 1 tablet by mouth every 8 hours as needed for Pain for up to 2 days. Max Daily Amount: 15 mg            ASK your doctor about these medications      acetaminophen 325 MG tablet  Commonly known as: TYLENOL     aspirin 81 MG EC tablet     metFORMIN 500 MG extended release tablet  Commonly known as: GLUCOPHAGE-XR     ONE-A-DAY WOMENS PRENATAL 1 PO               Where to Get Your Medications        You can get these medications from any pharmacy    Bring a paper prescription for each of these medications  ferrous sulfate 325 (65 Fe) MG tablet  ibuprofen 800 MG tablet  oxyCODONE 5 MG immediate release tablet          Discharge Date: 3/9/25    Condition on discharge: Stable    Plan:   Follow up in 6 week(s)  Reviewed discharge instructions and scripts     Britt Brice MD

## 2025-03-10 ENCOUNTER — TELEPHONE (OUTPATIENT)
Dept: FAMILY MEDICINE CLINIC | Age: 35
End: 2025-03-10

## 2025-03-10 NOTE — TELEPHONE ENCOUNTER
Care Transitions Initial Follow Up Call    Outreach made within 2 business days of discharge: Yes    Patient: Vianca Carrera Patient : 1990   MRN: [id number]  Reason for Admission: n/a  Discharge Date: 3/9/25       Spoke with: Patient  will follow up with OBGYN    Discharge department/facility: [facility]      Additional needs identified to be addressed with provider  No needs identified             Scheduled appointment with PCP within 7-14 days    Follow Up  No future appointments.    MICHELL ANG LPN

## 2025-03-10 NOTE — TELEPHONE ENCOUNTER
Care Transitions Initial Follow Up Call    Call within 2 business days of discharge: Yes     Patient: Vianca Carrera Patient : 1990 MRN: [id number]    [unfilled]    RARS: Readmission Risk Score: 3.1       Spoke with: patient had  will follow up with OBGYN    Discharge department/facility: [facility]     Non-face-to-face services provided:  Scheduled appointment with Specialist-OBGYN    Follow Up  No future appointments.    MICHELL ANG LPN

## (undated) DEVICE — PACK,CYSTOSCOPY,PK III,AURORA: Brand: MEDLINE

## (undated) DEVICE — DRESSING COMP IS W4XL10IN PD W2XL8IN CNTCT LAYR ADH

## (undated) DEVICE — TRAY CATHETER 16FR F INCLUDE BARDX IC COMPLT CARE DRNGE BG

## (undated) DEVICE — S/USE RESUS KIT W/O MASK (10): Brand: FISHER & PAYKEL HEALTHCARE

## (undated) DEVICE — NITINOL STONE RETRIEVAL BASKET: Brand: ZERO TIP

## (undated) DEVICE — GARMENT COMPR L FOR 23IN CALF FLOTRN

## (undated) DEVICE — BLADE CLIPPER GEN PURP NS

## (undated) DEVICE — GAUZE,SPONGE,4"X4",16PLY,XRAY,STRL,LF: Brand: MEDLINE

## (undated) DEVICE — SUTURE COAT VCRL SZ 4-0 L18IN ABSRB UD L19MM PS-2 1/2 CIR J496G

## (undated) DEVICE — PAD,NON-ADHERENT,3X8,STERILE,LF,1/PK: Brand: MEDLINE

## (undated) DEVICE — SUTURE VICRYL SZ 0 L36IN ABSRB UD L36MM CT-1 1/2 CIR J946H

## (undated) DEVICE — TUBING, SUCTION, 1/4" X 12', STRAIGHT: Brand: MEDLINE

## (undated) DEVICE — Z INACTIVE NO SUPPLIER SOLUTIONIRRIG 3000ML 0.9% SOD CHL FLX CONT [79720808] [HOSPIRA WORLDWIDE INC]

## (undated) DEVICE — SUTURE VICRYL SZ 3-0 L36IN ABSRB UD L36MM CT-1 1/2 CIR J944H

## (undated) DEVICE — SUTURE ABSORBABLE BRAIDED 2-0 CT-1 27 IN UD VICRYL J259H

## (undated) DEVICE — Device

## (undated) DEVICE — SINGLE ACTION PUMPING SYSTEM

## (undated) DEVICE — GLOVE ORANGE PI 7 1/2   MSG9075

## (undated) DEVICE — SOLUTION IRRIG 1000ML 0.9% SOD CHL USP POUR PLAS BTL

## (undated) DEVICE — COVERALL SURG UNIV POLYPR SLVLSS CUF STYL FASTENING TIE W/O

## (undated) DEVICE — PROTECTOR ULN NRV PUR FOAM HK LOOP STRP ANATOMICALLY

## (undated) DEVICE — CHLORAPREP 26ML ORANGE

## (undated) DEVICE — SUTURE VCRL SZ 3-0 L36IN ABSRB UD L36MM CT-1 1/2 CIR J944H

## (undated) DEVICE — TRAY URIN CATH 16FR DRNGE BG STATLOK STBL DEV F SURSTP

## (undated) DEVICE — KIT PRB L440MM DIA3.9MM BLU SWISS LITHOCLAST TRIL

## (undated) DEVICE — SOLUTION BOWL: Brand: KENDALL

## (undated) DEVICE — TUBING IRRIG L96IN DIA0.241IN L BOR T-U-R W/ NVENT PIERCING

## (undated) DEVICE — SUTURE PERMA-HAND SZ 2-0 L30IN NONABSORBABLE BLK L30MM FSL 679H

## (undated) DEVICE — STONE RETRIEVAL BASKET: Brand: SEGURA HEMISPHERE

## (undated) DEVICE — ADHESIVE SKIN CLSR 0.7ML TOP DERMBND ADV

## (undated) DEVICE — 3M™ STERI-STRIP™ COMPOUND BENZOIN TINCTURE 40 BAGS/CARTON 4 CARTONS/CASE C1544: Brand: 3M™ STERI-STRIP™

## (undated) DEVICE — 3M™ TEGADERM™ TRANSPARENT FILM DRESSING FRAME STYLE, 1628, 6 IN X 8 IN (15 CM X 20 CM), 10/CT 8CT/CASE: Brand: 3M™ TEGADERM™

## (undated) DEVICE — SOLUTION IV IRRIG POUR BRL 0.9% SODIUM CHL 2F7124

## (undated) DEVICE — GLOVE ORANGE PI 7   MSG9070

## (undated) DEVICE — GARMENT,MEDLINE,DVT,INT,CALF,MED, GEN2: Brand: MEDLINE

## (undated) DEVICE — DRP PK TUR LINGEMAN 77X120

## (undated) DEVICE — GLOVE SURG SZ 65 THK91MIL LTX FREE SYN POLYISOPRENE

## (undated) DEVICE — SUTURE MAXON 0 L36IN GRN ABSRB GS-24 L40MM 1/2 CIR REINF 8886628761

## (undated) DEVICE — Z INACTIVE NO ACTIVE SUPPLIER APPLICATOR MEDICATED 26 CC TINT HI-LITE ORNG STRL CHLORAPREP

## (undated) DEVICE — SUTURE VCRL SZ 0 L36IN ABSRB UD L36MM CT-1 1/2 CIR J946H

## (undated) DEVICE — SUTURE MCRYL SZ 3-0 L36IN ABSRB UD L36MM CT-1 1/2 CIR Y944H

## (undated) DEVICE — SUTURE MCRYL SZ 0 L36IN ABSRB VLT CT L40MM 1/2 CIR TAPR PNT Y358H

## (undated) DEVICE — KIT PRB L445MM DIA3.4MM GRN W/ STONE CTCH DISP SWISS

## (undated) DEVICE — CONTAINER SPEC 4OZ GRY LID TRNSLUC GENT-L-KARE

## (undated) DEVICE — MAT SUCT W36XL48IN FLD CTRL DISP